# Patient Record
Sex: FEMALE | Race: WHITE | NOT HISPANIC OR LATINO | URBAN - METROPOLITAN AREA
[De-identification: names, ages, dates, MRNs, and addresses within clinical notes are randomized per-mention and may not be internally consistent; named-entity substitution may affect disease eponyms.]

---

## 2024-05-07 ENCOUNTER — TELEPHONE (OUTPATIENT)
Dept: FAMILY MEDICINE | Facility: CLINIC | Age: 69
End: 2024-05-07

## 2024-05-07 NOTE — TELEPHONE ENCOUNTER
----- Message from Ghazal Richards sent at 5/7/2024  3:07 PM CDT -----  Contact: Sara Bradford is calling to get established. Patient has a blocker on spam call and will not get the message. Please try calling [phone]

## 2024-08-16 ENCOUNTER — HOSPITAL ENCOUNTER (OUTPATIENT)
Dept: RADIOLOGY | Facility: HOSPITAL | Age: 69
Discharge: HOME | End: 2024-08-16
Attending: FAMILY MEDICINE
Payer: MEDICARE

## 2024-08-16 ENCOUNTER — OFFICE VISIT (OUTPATIENT)
Dept: FAMILY MEDICINE | Facility: CLINIC | Age: 69
End: 2024-08-16
Payer: MEDICARE

## 2024-08-16 VITALS
OXYGEN SATURATION: 94 % | HEIGHT: 67 IN | DIASTOLIC BLOOD PRESSURE: 78 MMHG | HEART RATE: 44 BPM | WEIGHT: 161.88 LBS | SYSTOLIC BLOOD PRESSURE: 106 MMHG | BODY MASS INDEX: 25.41 KG/M2

## 2024-08-16 DIAGNOSIS — Z12.31 ENCOUNTER FOR SCREENING MAMMOGRAM FOR BREAST CANCER: ICD-10-CM

## 2024-08-16 DIAGNOSIS — Z12.11 ENCOUNTER FOR SCREENING COLONOSCOPY: ICD-10-CM

## 2024-08-16 DIAGNOSIS — Z13.220 LIPID SCREENING: ICD-10-CM

## 2024-08-16 DIAGNOSIS — F31.70 BIPOLAR AFFECTIVE DISORDER IN REMISSION: ICD-10-CM

## 2024-08-16 DIAGNOSIS — Z00.00 ENCOUNTER FOR MEDICAL EXAMINATION TO ESTABLISH CARE: Primary | ICD-10-CM

## 2024-08-16 DIAGNOSIS — Z78.0 MENOPAUSE: ICD-10-CM

## 2024-08-16 DIAGNOSIS — Z79.899 ENCOUNTER FOR LONG-TERM (CURRENT) USE OF OTHER MEDICATIONS: ICD-10-CM

## 2024-08-16 DIAGNOSIS — Z11.59 NEED FOR HEPATITIS C SCREENING TEST: ICD-10-CM

## 2024-08-16 DIAGNOSIS — Z86.73 HISTORY OF STROKE: ICD-10-CM

## 2024-08-16 DIAGNOSIS — M35.00 SJOGREN'S SYNDROME, WITH UNSPECIFIED ORGAN INVOLVEMENT: ICD-10-CM

## 2024-08-16 DIAGNOSIS — K11.20 PAROTITIS: ICD-10-CM

## 2024-08-16 DIAGNOSIS — G62.9 NEUROPATHY: ICD-10-CM

## 2024-08-16 DIAGNOSIS — Z11.4 SCREENING FOR HIV (HUMAN IMMUNODEFICIENCY VIRUS): ICD-10-CM

## 2024-08-16 DIAGNOSIS — J44.89 COPD WITH ASTHMA: ICD-10-CM

## 2024-08-16 PROBLEM — E78.5 HYPERLIPIDEMIA: Status: ACTIVE | Noted: 2021-03-26

## 2024-08-16 PROBLEM — M79.2 NEURALGIA: Status: ACTIVE | Noted: 2024-08-16

## 2024-08-16 PROBLEM — S93.419A SPRAIN OF CALCANEOFIBULAR LIGAMENT OF ANKLE: Status: ACTIVE | Noted: 2024-08-16

## 2024-08-16 PROBLEM — M19.90 ARTHRITIS: Status: ACTIVE | Noted: 2021-03-26

## 2024-08-16 PROBLEM — J40 BRONCHITIS: Status: ACTIVE | Noted: 2021-03-26

## 2024-08-16 PROBLEM — B37.9 CANDIDIASIS: Status: ACTIVE | Noted: 2024-08-16

## 2024-08-16 PROBLEM — J30.2 SEASONAL ALLERGIES: Status: ACTIVE | Noted: 2021-03-26

## 2024-08-16 PROBLEM — M25.519 SHOULDER JOINT PAIN: Status: ACTIVE | Noted: 2024-08-16

## 2024-08-16 PROBLEM — R25.2 SPASM: Status: ACTIVE | Noted: 2024-08-16

## 2024-08-16 PROBLEM — J98.4 RESTRICTIVE LUNG DISEASE: Status: ACTIVE | Noted: 2024-08-16

## 2024-08-16 PROBLEM — M20.10 HALLUX VALGUS WITH BUNIONS: Status: ACTIVE | Noted: 2024-08-16

## 2024-08-16 PROBLEM — J45.909 ASTHMA: Status: ACTIVE | Noted: 2021-03-26

## 2024-08-16 PROBLEM — M79.18 MYALGIA, MULTIPLE SITES: Status: ACTIVE | Noted: 2024-08-16

## 2024-08-16 PROBLEM — J32.9 CHRONIC SINUSITIS: Status: ACTIVE | Noted: 2024-08-16

## 2024-08-16 PROBLEM — J44.9 COPD (CHRONIC OBSTRUCTIVE PULMONARY DISEASE): Status: ACTIVE | Noted: 2018-06-07

## 2024-08-16 PROBLEM — J30.9 ALLERGIC RHINITIS: Status: ACTIVE | Noted: 2024-08-16

## 2024-08-16 PROBLEM — M21.619 HALLUX VALGUS WITH BUNIONS: Status: ACTIVE | Noted: 2024-08-16

## 2024-08-16 PROCEDURE — 77080 DXA BONE DENSITY AXIAL: CPT | Mod: 26,,, | Performed by: RADIOLOGY

## 2024-08-16 PROCEDURE — 99215 OFFICE O/P EST HI 40 MIN: CPT | Mod: PBBFAC,25,PO | Performed by: FAMILY MEDICINE

## 2024-08-16 PROCEDURE — 77080 DXA BONE DENSITY AXIAL: CPT | Mod: TC,PO

## 2024-08-16 PROCEDURE — G2211 COMPLEX E/M VISIT ADD ON: HCPCS | Mod: S$PBB,,, | Performed by: FAMILY MEDICINE

## 2024-08-16 PROCEDURE — 99999 PR PBB SHADOW E&M-EST. PATIENT-LVL V: CPT | Mod: PBBFAC,,, | Performed by: FAMILY MEDICINE

## 2024-08-16 PROCEDURE — 99204 OFFICE O/P NEW MOD 45 MIN: CPT | Mod: S$PBB,,, | Performed by: FAMILY MEDICINE

## 2024-08-16 RX ORDER — CLONAZEPAM 0.5 MG/1
0.5 TABLET ORAL 2 TIMES DAILY PRN
COMMUNITY

## 2024-08-16 RX ORDER — DEUTETRABENAZINE 12 MG/1
TABLET, COATED ORAL
COMMUNITY
Start: 2023-11-08

## 2024-08-16 RX ORDER — PILOCARPINE HYDROCHLORIDE 5 MG/1
5 TABLET, FILM COATED ORAL 2 TIMES DAILY
COMMUNITY
Start: 2024-06-07

## 2024-08-16 RX ORDER — OMEPRAZOLE 20 MG/1
20 CAPSULE, DELAYED RELEASE ORAL
COMMUNITY
Start: 2024-05-04

## 2024-08-16 RX ORDER — CLOPIDOGREL BISULFATE 75 MG/1
TABLET, FILM COATED ORAL
COMMUNITY
End: 2024-08-16 | Stop reason: SDUPTHER

## 2024-08-16 RX ORDER — FOLIC ACID 1 MG/1
TABLET ORAL
COMMUNITY
Start: 2024-05-30

## 2024-08-16 RX ORDER — SCOLOPAMINE TRANSDERMAL SYSTEM 1 MG/1
PATCH, EXTENDED RELEASE TRANSDERMAL
Status: CANCELLED | OUTPATIENT
Start: 2024-08-16

## 2024-08-16 RX ORDER — KRILL/OM-3/DHA/EPA/PHOSPHO/AST 500-150-45
CAPSULE ORAL
COMMUNITY

## 2024-08-16 RX ORDER — EZETIMIBE 10 MG/1
TABLET ORAL
COMMUNITY
Start: 2024-06-07 | End: 2024-08-23 | Stop reason: SDUPTHER

## 2024-08-16 RX ORDER — ZOLPIDEM TARTRATE 10 MG/1
TABLET ORAL
COMMUNITY
Start: 2024-03-19

## 2024-08-16 RX ORDER — ALBUTEROL SULFATE 0.83 MG/ML
2.5 SOLUTION RESPIRATORY (INHALATION) 4 TIMES DAILY
COMMUNITY
Start: 2024-06-07

## 2024-08-16 RX ORDER — PROPRANOLOL HYDROCHLORIDE 10 MG/1
TABLET ORAL
COMMUNITY
Start: 2024-03-19

## 2024-08-16 RX ORDER — MONTELUKAST SODIUM 10 MG/1
TABLET ORAL
COMMUNITY
Start: 2023-12-26 | End: 2024-08-16 | Stop reason: SDUPTHER

## 2024-08-16 RX ORDER — LAMOTRIGINE 200 MG/1
TABLET ORAL
COMMUNITY

## 2024-08-16 RX ORDER — LORATADINE 10 MG/1
TABLET ORAL
COMMUNITY

## 2024-08-16 RX ORDER — AMOXICILLIN AND CLAVULANATE POTASSIUM 875; 125 MG/1; MG/1
1 TABLET, FILM COATED ORAL 2 TIMES DAILY
Qty: 20 TABLET | Refills: 0 | Status: SHIPPED | OUTPATIENT
Start: 2024-08-16 | End: 2024-08-26

## 2024-08-16 RX ORDER — ESTRADIOL 1 MG/1
TABLET ORAL
Status: CANCELLED | OUTPATIENT
Start: 2024-08-16

## 2024-08-16 RX ORDER — DULOXETIN HYDROCHLORIDE 30 MG/1
30 CAPSULE, DELAYED RELEASE ORAL
COMMUNITY
Start: 2024-04-24

## 2024-08-16 RX ORDER — GABAPENTIN 100 MG/1
100 CAPSULE ORAL 2 TIMES DAILY
COMMUNITY
End: 2024-08-16 | Stop reason: SDUPTHER

## 2024-08-16 RX ORDER — LANOLIN ALCOHOL/MO/W.PET/CERES
CREAM (GRAM) TOPICAL
COMMUNITY

## 2024-08-16 RX ORDER — DULOXETIN HYDROCHLORIDE 20 MG/1
20 CAPSULE, DELAYED RELEASE ORAL
COMMUNITY
Start: 2024-04-28

## 2024-08-16 RX ORDER — CLOPIDOGREL BISULFATE 75 MG/1
75 TABLET, FILM COATED ORAL DAILY
Qty: 90 TABLET | Refills: 4 | Status: SHIPPED | OUTPATIENT
Start: 2024-08-16

## 2024-08-16 RX ORDER — ESTRADIOL 1 MG/1
TABLET ORAL
COMMUNITY
Start: 2024-06-07

## 2024-08-16 RX ORDER — CLONAZEPAM 0.25 MG/1
0.25 TABLET, ORALLY DISINTEGRATING ORAL DAILY
COMMUNITY
Start: 2024-01-22

## 2024-08-16 RX ORDER — FLUTICASONE FUROATE, UMECLIDINIUM BROMIDE AND VILANTEROL TRIFENATATE 100; 62.5; 25 UG/1; UG/1; UG/1
POWDER RESPIRATORY (INHALATION)
COMMUNITY
Start: 2024-06-07 | End: 2024-08-16 | Stop reason: SDUPTHER

## 2024-08-16 RX ORDER — QUETIAPINE FUMARATE 300 MG/1
TABLET, FILM COATED ORAL
COMMUNITY
Start: 2024-03-19

## 2024-08-16 RX ORDER — GABAPENTIN 100 MG/1
200 CAPSULE ORAL NIGHTLY
Qty: 180 CAPSULE | Refills: 4 | Status: SHIPPED | OUTPATIENT
Start: 2024-08-16

## 2024-08-16 RX ORDER — LEVOCARNITINE TARTRATE 500 MG
CAPSULE ORAL
COMMUNITY

## 2024-08-16 RX ORDER — FLUTICASONE FUROATE, UMECLIDINIUM BROMIDE AND VILANTEROL TRIFENATATE 100; 62.5; 25 UG/1; UG/1; UG/1
1 POWDER RESPIRATORY (INHALATION) DAILY
Qty: 60 EACH | Refills: 12 | Status: SHIPPED | OUTPATIENT
Start: 2024-08-16

## 2024-08-16 RX ORDER — MONTELUKAST SODIUM 10 MG/1
10 TABLET ORAL NIGHTLY
Qty: 90 TABLET | Refills: 4 | Status: SHIPPED | OUTPATIENT
Start: 2024-08-16

## 2024-08-16 RX ORDER — ZIPRASIDONE HYDROCHLORIDE 20 MG/1
CAPSULE ORAL
COMMUNITY

## 2024-08-16 RX ORDER — ALBUTEROL SULFATE 90 UG/1
2 INHALANT RESPIRATORY (INHALATION) 4 TIMES DAILY
Qty: 1 EACH | Refills: 12 | Status: SHIPPED | OUTPATIENT
Start: 2024-08-16

## 2024-08-16 NOTE — PROGRESS NOTES
PLAN:    Assessment & Plan  1. Establishment of care.  She is a new patient establishing care after moving from [place]. She has a history of Sjogren's syndrome, bipolar disorder, COPD, and asthma. She is currently managed by a psychiatrist for her bipolar disorder, which has been stable for 4 years. She will continue seeing her psychiatrist virtually and in-person every 6 months. Blood work will be updated today as she is fasting.    2. Parotid gland swelling.  She reports swelling in her parotid gland that started 2 days ago. She has a history of Sjogren's syndrome and typically takes antibiotics for this condition. She is allergic to Cephalexin and Levaquin but can take Augmentin, which has been effective in the past. A prescription for Augmentin will be sent to [facility] on [facility] in [place]. If the swelling does not improve with antibiotics, she should see an ENT specialist sooner.    3. Bipolar disorder.  Her bipolar disorder is stable with current psychiatric medications managed by her psychiatrist. She will continue her current regimen and follow up with her psychiatrist virtually every 3 months and in-person every 6 months.    4. COPD and asthma.  She uses ProAir (2 puffs, four times a day) and Trelegy (1 puff daily) to manage her COPD and asthma. She also takes Singulair for these conditions. She will continue her current medication regimen and sees a pulmonologist for check-ups and prescriptions as needed.    5. Neuropathy.  She takes Gabapentin 200 mg at night for neuropathy in her legs, feet, and hands. She will continue this medication.    6. Panic attacks.  She takes Propranolol 10 mg at night to manage severe panic attacks, which keeps her heart rate in the 40s. She will continue this medication.    7. Health Maintenance.  She is due for a mammogram, bone density scan, and colonoscopy. The mammogram and bone density scan will be scheduled in this building. The colonoscopy will be scheduled at  [facility] in [place].    Follow-up  The patient will follow up in 6 months.    Problem List Items Addressed This Visit       Bipolar affective disorder in remission (Chronic)     Continue current medications.  Follow-up with Psychiatry.  ER precautions for any severe symptoms.         COPD with asthma (Chronic)     Continue inhalers.  COPD with asthma precautions.  Follow-up with Pulmonary if no improvement.  ER precautions for severe symptoms.         Relevant Medications    albuterol sulfate (PROAIR DIGIHALER) 90 mcg/actuation aebs    montelukast (SINGULAIR) 10 mg tablet    fluticasone-umeclidin-vilanter (TRELEGY ELLIPTA) 100-62.5-25 mcg DsDv    History of stroke (Chronic)     Continue Plavix.  Follow-up with Neurology stroke precautions.  ER precautions.         Relevant Medications    PLAVIX 75 mg tablet    Neuropathy (Chronic)     Continue gabapentin.  Discussed risk and benefits of medication use.         Relevant Medications    gabapentin (NEURONTIN) 100 MG capsule    Menopause (Chronic)     Update bone density scan.  Follow-up after for results.         Relevant Orders    DXA Bone Density Axial Skeleton 1 or more sites (Completed)    Encounter for long-term (current) use of other medications (Chronic)     Complete history and physical was completed today.  Complete and thorough medication reconciliation was performed.  Discussed risks and benefits of medications.  Advised patient on orders and health maintenance.  We discussed old records and old labs if available.  Will request any records not available through epic.  Continue current medications listed on your summary sheet.           Relevant Orders    Lipid Panel (Completed)    Hemoglobin A1C (Completed)    CBC Without Differential (Completed)    Comprehensive Metabolic Panel (Completed)    TSH (Completed)    Sjogren's syndrome    Parotitis     Start Augmentin.Discussed condition course and signs and symptoms to expect.  Patient advised take  anti-inflammatories and or Tylenol for pain or fever.  ER precautions.  Call MD or follow-up to clinic if not improving or worsening symptoms.           Relevant Medications    amoxicillin-clavulanate 875-125mg (AUGMENTIN) 875-125 mg per tablet    Other Relevant Orders    Ambulatory referral/consult to ENT    Encounter for medical examination to establish care - Primary     Complete history and physical was completed today.  Complete and thorough medication reconciliation was performed.  Discussed risks and benefits of medications.  Advised patient on orders and health maintenance.  We discussed old records and old labs if available.  Will request any records not available through epic.  Continue current medications listed on your summary sheet.           Relevant Orders    Lipid Panel (Completed)    Hepatitis C Antibody (Completed)    HIV 1/2 Ag/Ab (4th Gen) (Completed)    Hemoglobin A1C (Completed)    CBC Without Differential (Completed)    Comprehensive Metabolic Panel (Completed)    TSH (Completed)     Other Visit Diagnoses       Need for hepatitis C screening test        Relevant Orders    Hepatitis C Antibody (Completed)    Screening for HIV (human immunodeficiency virus)        Relevant Orders    HIV 1/2 Ag/Ab (4th Gen) (Completed)    Lipid screening        Relevant Orders    Lipid Panel (Completed)    Encounter for screening colonoscopy        Relevant Orders    Ambulatory referral/consult to Endo Procedure     Encounter for screening mammogram for breast cancer        Relevant Orders    Mammo Digital Screening Bilat w/ Armen          Future Appointments       Date Provider Specialty Appt Notes    8/22/2024  Pre-Admission Testing colonoscopy    8/23/2024  Radiology     10/9/2024 Jean Jose MD Otolaryngology parotitis           Medication Management for assessment above:   Medication List with Changes/Refills   New Medications    AMOXICILLIN-CLAVULANATE 875-125MG (AUGMENTIN) 875-125 MG PER  TABLET    Take 1 tablet by mouth 2 (two) times daily. Take with food. for 10 days   Current Medications    ALBUTEROL (PROVENTIL) 2.5 MG /3 ML (0.083 %) NEBULIZER SOLUTION    Take 2.5 mg by nebulization 4 (four) times daily.    AUSTEDO 12 MG TAB        CLONAZEPAM (KLONOPIN) 0.25 MG TBDL    0.25 mg once daily.    CLONAZEPAM (KLONOPIN) 0.5 MG TABLET    Take 0.5 mg by mouth 2 (two) times daily as needed for Anxiety.    CYANOCOBALAMIN (VITAMIN B-12) 1000 MCG TABLET    Take 1 tablet every day by oral route as directed.    DULOXETINE (CYMBALTA) 20 MG CAPSULE    Take 20 mg by mouth.    DULOXETINE (CYMBALTA) 30 MG CAPSULE    Take 30 mg by mouth.    ESTRADIOL (ESTRACE) 1 MG TABLET        EZETIMIBE (ZETIA) 10 MG TABLET        FOLIC ACID (FOLVITE) 1 MG TABLET        GEODON 20 MG CAP        KRILL-OM-3-DHA-EPA-PHOSPHO-AST (KRILL OIL) 933-726-51-75 MG CAP        LAMOTRIGINE (LAMICTAL) 200 MG TABLET    Take 1 tablet every day by oral route as directed.    LEVOCARNITINE (L-CARNITINE) 500 MG CAP        LORATADINE (CLARITIN) 10 MG TABLET    Take 1 tablet every day by oral route in the evening for 30 days.    OMEPRAZOLE (PRILOSEC) 20 MG CAPSULE    Take 20 mg by mouth.    PILOCARPINE (SALAGEN) 5 MG TAB    Take 5 mg by mouth 2 (two) times daily.    PROPRANOLOL (INDERAL) 10 MG TABLET        QUETIAPINE (SEROQUEL) 300 MG TAB        SCOPOLAMINE BASE TD        VITAMIN B COMPLEX (SUPER B COMPLEX-B-12 ORAL)        ZOLPIDEM (AMBIEN) 10 MG TAB       Changed and/or Refilled Medications    Modified Medication Previous Medication    ALBUTEROL SULFATE (PROAIR DIGIHALER) 90 MCG/ACTUATION AEBS albuterol sulfate (PROAIR DIGIHALER INHL)       Inhale 2 puffs into the lungs 4 (four) times daily.        FLUTICASONE-UMECLIDIN-VILANTER (TRELEGY ELLIPTA) 100-62.5-25 MCG DSDV fluticasone-umeclidin-vilanter (TRELEGY ELLIPTA) 100-62.5-25 mcg DsDv       Inhale 1 puff into the lungs once daily.        GABAPENTIN (NEURONTIN) 100 MG CAPSULE gabapentin (NEURONTIN) 100  MG capsule       Take 2 capsules (200 mg total) by mouth every evening.    Take 100 mg by mouth 2 (two) times daily.    MONTELUKAST (SINGULAIR) 10 MG TABLET montelukast (SINGULAIR) 10 mg tablet       Take 1 tablet (10 mg total) by mouth every evening.        PLAVIX 75 MG TABLET PLAVIX 75 mg tablet       Take 1 tablet (75 mg total) by mouth once daily.           Oliver Connor M.D.  ==========================================================================  Subjective:   Patient ID: Sara Stark is a 69 y.o. female.  has no past medical history on file.   Chief Complaint: Establish Care and Annual Exam      History of Present Illness  The patient is a pleasant 69-year-old female here to establish care. She is here today to get some blood work and discuss her chronic medical conditions. She is also due for some health maintenance items.    She has a history of Sjogren's syndrome and was previously under the care of an ENT specialist, Dr. Cabrera, in [place]. She is considering establishing care with a local rheumatologist. She typically receives her medication from her primary care provider and sees him every six months. She takes pilocarpine daily for her Sjogren's syndrome. She reports swelling in her neck, which alternates between her right and left parotid glands but never occurs simultaneously. She is unsure of the specific antibiotic she takes for this swelling but knows she is allergic to CEPHALEXIN and LEVAQUIN. She can tolerate AUGMENTIN, which has been effective for her in the past. She believes the AUGMENTIN is helping.    She reports feeling unwell, with symptoms starting two days ago.    She has a history of COPD and asthma and uses ProAir, taking 2 puffs four times a day. She also takes Singulair and Trelegy, 1 puff daily, for her COPD and asthma. She sees a pulmonologist for checkups and if she develops bronchitis, which occurs once or twice a year, they prescribe medication over the phone. She  has environmental allergies to grass and cats and has received allergy shots for grass and weeds. She was not treated for her cat allergy as she plans to avoid them.    She takes gabapentin 200 mg at night for neuropathy in her legs, feet, and hands. She is considering discontinuing estradiol.    She had a left-sided stroke in 2020 with no residual effects. At that time, her neurologist informed her of an 80 percent blockage on the left side of her carotid. She has a neurologist and all her specialists are in [place]. She has moved here from [place].    She takes propranolol 10 mg at night to manage severe panic attacks. If she misses a dose, she experiences a panic attack. She has a history of smoking a pack per day for several years but has since quit.    She is due for a colonoscopy and is fasting today. She is also due for a mammogram and a bone density scan.    She sees her psychiatrist every 3 months virtually and then every 6 months in person. She takes all her psychiatric medications for bipolar disorder, which has been stable for 4 years.    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    FAMILY HISTORY  Her mother had COPD.    ALLERGIES  She is allergic to CEPHALEXIN and LEVAQUIN.    Problem List Items Addressed This Visit       Bipolar affective disorder in remission (Chronic)    Overview     Chronic.  Stable complex regimen.  Follows closely with Psychiatry.         Current Assessment & Plan     Continue current medications.  Follow-up with Psychiatry.  ER precautions for any severe symptoms.         COPD with asthma (Chronic)    Overview     Chronic.  Stable.  Patient on albuterol and Trelegy.  Not currently following with Pulmonary.  History of smoking.         Current Assessment & Plan     Continue inhalers.  COPD with asthma precautions.  Follow-up with Pulmonary if no improvement.  ER precautions for severe symptoms.          History of stroke (Chronic)    Overview     Patient reports history of stroke with no residual deficits.  See HPI.  She is on Plavix.  Reports compliance no side effects reported.         Current Assessment & Plan     Continue Plavix.  Follow-up with Neurology stroke precautions.  ER precautions.         Neuropathy (Chronic)    Overview     Chronic.  Etiology unknown.  Patient on gabapentin.  She uses this to sleep.         Current Assessment & Plan     Continue gabapentin.  Discussed risk and benefits of medication use.         Menopause (Chronic)    Overview     Chronic.  Stable.  Patient is due for bone density scanning for osteoporosis.         Current Assessment & Plan     Update bone density scan.  Follow-up after for results.         Encounter for long-term (current) use of other medications (Chronic)    Overview     CHRONIC. Stable. Compliant with medications for managed conditions. See medication list. No SE reported.   Routine lab analysis is being monitored. Refills were addressed.  Lab Results   Component Value Date    WBC 8.26 08/16/2024    HGB 12.3 08/16/2024    HCT 41.8 08/16/2024    MCV 92 08/16/2024     08/16/2024         Chemistry        Component Value Date/Time     08/16/2024 1456    K 4.5 08/16/2024 1456     08/16/2024 1456    CO2 25 08/16/2024 1456    BUN 6 (L) 08/16/2024 1456    CREATININE 0.8 08/16/2024 1456    GLU 98 08/16/2024 1456        Component Value Date/Time    CALCIUM 9.2 08/16/2024 1456    ALKPHOS 73 08/16/2024 1456    AST 15 08/16/2024 1456    ALT 9 (L) 08/16/2024 1456    BILITOT 0.5 08/16/2024 1456          Lab Results   Component Value Date    TSH 0.473 08/16/2024              Current Assessment & Plan     Complete history and physical was completed today.  Complete and thorough medication reconciliation was performed.  Discussed risks and benefits of medications.  Advised patient on orders and health maintenance.  We discussed old records and old labs if  available.  Will request any records not available through epic.  Continue current medications listed on your summary sheet.           Sjogren's syndrome    Parotitis    Overview     Recurrent problem.  Patient has history of Sjogren's and occasionally gets inflamed/infected parotid glands normally resolves with antibiotics.  Currently with pain swelling in the left jaw.         Current Assessment & Plan     Start Augmentin.Discussed condition course and signs and symptoms to expect.  Patient advised take anti-inflammatories and or Tylenol for pain or fever.  ER precautions.  Call MD or follow-up to clinic if not improving or worsening symptoms.           Encounter for medical examination to establish care - Primary    Overview     New patient.  [facility])  ENT- Dr. Canales  Pulm- Dr. Rosa  Neurology- Dr. Figueroa (stroke 2020)  Ortho- Dr. Shell  Psych-Dr. Colon(psych meds)          Current Assessment & Plan     Complete history and physical was completed today.  Complete and thorough medication reconciliation was performed.  Discussed risks and benefits of medications.  Advised patient on orders and health maintenance.  We discussed old records and old labs if available.  Will request any records not available through epic.  Continue current medications listed on your summary sheet.            Other Visit Diagnoses       Need for hepatitis C screening test        Screening for HIV (human immunodeficiency virus)        Lipid screening        Encounter for screening colonoscopy        Encounter for screening mammogram for breast cancer                 Review of patient's allergies indicates:   Allergen Reactions    Levofloxacin in d5w Blisters and Swelling    Cephalexin Hives, Itching, Nausea And Vomiting and Rash     Current Outpatient Medications   Medication Instructions    albuterol (PROVENTIL) 2.5 mg, Nebulization, 4 times daily    albuterol sulfate  (PROAIR DIGIHALER) 90 mcg/actuation aebs 2 puffs, Inhalation, 4 times daily    amoxicillin-clavulanate 875-125mg (AUGMENTIN) 875-125 mg per tablet 1 tablet, Oral, 2 times daily, Take with food.    AUSTEDO 12 mg Tab     clonazePAM (KLONOPIN) 0.5 mg, Oral, 2 times daily PRN    clonazePAM (KLONOPIN) 0.25 mg, Daily    cyanocobalamin (VITAMIN B-12) 1000 MCG tablet Take 1 tablet every day by oral route as directed.    DULoxetine (CYMBALTA) 20 mg, Oral    DULoxetine (CYMBALTA) 30 mg, Oral    estradioL (ESTRACE) 1 MG tablet     ezetimibe (ZETIA) 10 mg tablet     fluticasone-umeclidin-vilanter (TRELEGY ELLIPTA) 100-62.5-25 mcg DsDv 1 puff, Inhalation, Daily    folic acid (FOLVITE) 1 MG tablet     gabapentin (NEURONTIN) 200 mg, Oral, Nightly    GEODON 20 mg Cap     krill-om-3-dha-epa-phospho-ast (KRILL OIL) 759-548-24-75 mg Cap     lamoTRIgine (LAMICTAL) 200 MG tablet Take 1 tablet every day by oral route as directed.    levOCARNitine (L-CARNITINE) 500 mg Cap     loratadine (CLARITIN) 10 mg tablet Take 1 tablet every day by oral route in the evening for 30 days.    montelukast (SINGULAIR) 10 mg, Oral, Nightly    omeprazole (PRILOSEC) 20 mg, Oral    pilocarpine (SALAGEN) 5 mg, Oral, 2 times daily    PLAVIX 75 mg, Oral, Daily    propranoloL (INDERAL) 10 MG tablet     QUEtiapine (SEROQUEL) 300 MG Tab     SCOPOLAMINE BASE TD     vitamin B complex (SUPER B COMPLEX-B-12 ORAL)     zolpidem (AMBIEN) 10 mg Tab       I have reviewed the PMH, social history, FamilyHx, surgical history, allergies and medications documented / confirmed by the patient at the time of this visit.  Review of Systems   Constitutional:  Positive for fatigue. Negative for chills, fever and unexpected weight change.   HENT:  Positive for ear pain. Negative for sore throat.    Eyes:  Negative for redness and visual disturbance.   Respiratory:  Negative for cough and shortness of breath.    Cardiovascular:  Negative for chest pain and palpitations.  "  Gastrointestinal:  Negative for nausea and vomiting.   Genitourinary:  Negative for difficulty urinating and hematuria.   Musculoskeletal:  Positive for arthralgias. Negative for myalgias.   Skin:  Negative for rash and wound.   Neurological:  Negative for weakness and headaches.   Psychiatric/Behavioral:  Negative for sleep disturbance. The patient is not nervous/anxious.      Objective:   /78   Pulse (!) 44   Ht 5' 7" (1.702 m)   Wt 73.4 kg (161 lb 14.4 oz)   SpO2 (!) 94%   BMI 25.36 kg/m²   Physical Exam  Vitals and nursing note reviewed.   Constitutional:       General: She is not in acute distress.     Appearance: She is well-developed. She is not ill-appearing, toxic-appearing or diaphoretic.   HENT:      Head: Normocephalic and atraumatic.      Jaw: Tenderness present.      Salivary Glands: Left salivary gland is diffusely enlarged and tender.        Right Ear: Hearing, tympanic membrane, ear canal and external ear normal. There is no impacted cerumen.      Left Ear: Hearing, tympanic membrane, ear canal and external ear normal. There is no impacted cerumen.      Nose: Congestion present. No rhinorrhea.      Mouth/Throat:      Pharynx: No posterior oropharyngeal erythema.   Eyes:      General: Lids are normal.      Extraocular Movements: Extraocular movements intact.      Conjunctiva/sclera: Conjunctivae normal.      Pupils: Pupils are equal, round, and reactive to light.   Cardiovascular:      Rate and Rhythm: Bradycardia present.      Pulses: Normal pulses.   Pulmonary:      Effort: Pulmonary effort is normal. No respiratory distress.      Breath sounds: Normal breath sounds.   Abdominal:      General: Bowel sounds are normal.      Palpations: Abdomen is soft.   Musculoskeletal:         General: Tenderness present. Normal range of motion.      Cervical back: Normal range of motion and neck supple.   Skin:     General: Skin is warm and dry.      Capillary Refill: Capillary refill takes less than " 2 seconds.      Coloration: Skin is not pale.   Neurological:      General: No focal deficit present.      Mental Status: She is alert and oriented to person, place, and time. She is not disoriented.      Cranial Nerves: No cranial nerve deficit.      Motor: No weakness.      Gait: Gait normal.   Psychiatric:         Attention and Perception: She is attentive.         Mood and Affect: Mood normal. Mood is not anxious or depressed.         Speech: Speech is not rapid and pressured or slurred.         Behavior: Behavior normal. Behavior is not agitated, aggressive or hyperactive. Behavior is cooperative.         Thought Content: Thought content normal. Thought content is not paranoid or delusional. Thought content does not include homicidal or suicidal ideation. Thought content does not include homicidal or suicidal plan.         Cognition and Memory: Memory is not impaired.         Judgment: Judgment normal.       Physical Exam  Vital Signs  Heart rate is 44.    Results      Assessment:     1. Encounter for medical examination to establish care    2. Encounter for long-term (current) use of other medications    3. Need for hepatitis C screening test    4. Screening for HIV (human immunodeficiency virus)    5. Lipid screening    6. Encounter for screening colonoscopy    7. Encounter for screening mammogram for breast cancer    8. Bipolar affective disorder in remission    9. Parotitis    10. COPD with asthma    11. Menopause    12. Neuropathy    13. History of stroke    14. Sjogren's syndrome, with unspecified organ involvement      MDM:   New patient  Total time: 45 minutes.  This includes total time spent on the encounter, which includes face to face time and non-face to face time preparing to see the patient (eg, review of previous medical records, tests), Obtaining and/or reviewing separately obtained history, documenting clinical information in the electronic or other health record, independently interpreting  results (not separately reported)/communicating results to the patient/family/caregiver, and/or care coordination (not separately reported).    I have Reviewed and summarized old records.  I have performed thorough medication reconciliation today and discussed risk and benefits of medications.  I have reviewed labs and discussed with patient.  All questions were answered.  I am requesting old records and will review them once they are available.Prev PCP- Dr. Weir ([facility])  ENT- Dr. Canales  Pulm- Dr. Rosa  Neurology- Dr. Figueroa (stroke 2020)  Ortho- Dr. Shell  Psych-Dr. Colon(psych meds)   Visit today included increased complexity associated with the care of the episodic problem see above assessment addressed and managing the longitudinal care of the patient due to the serious and/or complex managed problem(s) see above.  I have signed for the following orders AND/OR meds.  Orders Placed This Encounter   Procedures    Mammo Digital Screening Bilat w/ Armen     Standing Status:   Future     Standing Expiration Date:   2/16/2026     Order Specific Question:   May the Radiologist modify the order per protocol to meet the clinical needs of the patient?     Answer:   Yes    DXA Bone Density Axial Skeleton 1 or more sites     Standing Status:   Future     Number of Occurrences:   1     Standing Expiration Date:   8/16/2027     Order Specific Question:   May the Radiologist modify the order per protocol to meet the clinical needs of the patient?     Answer:   Yes     Order Specific Question:   Release to patient     Answer:   Immediate    Lipid Panel     Standing Status:   Future     Number of Occurrences:   1     Standing Expiration Date:   10/15/2025    Hepatitis C Antibody     Standing Status:   Future     Number of Occurrences:   1     Standing Expiration Date:   10/15/2025    HIV 1/2 Ag/Ab (4th Gen)     Standing Status:   Future     Number of Occurrences:   1     Standing Expiration Date:   10/15/2025     Hemoglobin A1C     Standing Status:   Future     Number of Occurrences:   1     Standing Expiration Date:   10/15/2025    CBC Without Differential     Standing Status:   Future     Number of Occurrences:   1     Standing Expiration Date:   10/15/2025    Comprehensive Metabolic Panel     Standing Status:   Future     Number of Occurrences:   1     Standing Expiration Date:   10/15/2025    TSH     Standing Status:   Future     Number of Occurrences:   1     Standing Expiration Date:   10/15/2025    Ambulatory referral/consult to Endo Procedure      Standing Status:   Future     Standing Expiration Date:   2/28/2025     Referral Priority:   Routine     Referral Type:   Consultation     Number of Visits Requested:   1    Ambulatory referral/consult to ENT     Standing Status:   Future     Standing Expiration Date:   9/16/2025     Referral Priority:   Routine     Referral Type:   Consultation     Referral Reason:   Specialty Services Required     Requested Specialty:   Otolaryngology     Number of Visits Requested:   1     Medications Ordered This Encounter   Medications    albuterol sulfate (PROAIR DIGIHALER) 90 mcg/actuation aebs     Sig: Inhale 2 puffs into the lungs 4 (four) times daily.     Dispense:  1 each     Refill:  12    amoxicillin-clavulanate 875-125mg (AUGMENTIN) 875-125 mg per tablet     Sig: Take 1 tablet by mouth 2 (two) times daily. Take with food. for 10 days     Dispense:  20 tablet     Refill:  0    fluticasone-umeclidin-vilanter (TRELEGY ELLIPTA) 100-62.5-25 mcg DsDv     Sig: Inhale 1 puff into the lungs once daily.     Dispense:  60 each     Refill:  12    gabapentin (NEURONTIN) 100 MG capsule     Sig: Take 2 capsules (200 mg total) by mouth every evening.     Dispense:  180 capsule     Refill:  4    montelukast (SINGULAIR) 10 mg tablet     Sig: Take 1 tablet (10 mg total) by mouth every evening.     Dispense:  90 tablet     Refill:  4    PLAVIX 75 mg tablet     Sig: Take 1 tablet (75  mg total) by mouth once daily.     Dispense:  90 tablet     Refill:  4        Follow up in about 6 months (around 2/16/2025), or if symptoms worsen or fail to improve, for Med refills, LAB RESULTS.  Future Appointments       Date Provider Specialty Appt Notes    8/22/2024  Pre-Admission Testing colonoscopy    8/23/2024  Radiology     10/9/2024 Jean Jose MD Otolaryngology parotitis          If no improvement in symptoms or symptoms worsen, advised to call/follow-up at clinic or go to ER. Patient voiced understanding and all questions/concerns were addressed.   DISCLAIMER: This note was compiled by using a speech recognition dictation system and therefore please be aware that typographical / speech recognition errors can and do occur.  Please contact me if you see any errors specifically.  Consent was obtained for SIMONE recording system prior to the visit.    Oliver Connor M.D.       Office: [phone] [address] [place] [address]  FAX: [phone]

## 2024-08-16 NOTE — PATIENT INSTRUCTIONS
Follow up in about 1 year (around 8/16/2025), or if symptoms worsen or fail to improve.     Dear patient,   As a result of recent federal legislation (The Federal Cures Act), you may receive lab or pathology results from your visit in your [facility] account before your physician is able to contact you. Your physician or their representative will relay the results to you with their recommendations at their soonest availability.     If no improvement in symptoms or symptoms worsen, please be advised to call MD, follow-up at clinic and/or go to ER if becomes severe.    Oliver Connor M.D.        We Offer TELEHEALTH & Same Day Appointments!   Book your Telehealth appointment with me through my nurse or   Clinic appointments on Logly!    [address] [place] [address]    Office: [phone]   FAX: [phone]    Check out my Facebook Page and Follow Me at: [email or web address]    Check out my website at [email or web address] by clicking on: [email or web address]    To Schedule appointments online, go to [email or web address] Onsite: [email or web address]

## 2024-08-17 PROBLEM — K11.20 PAROTITIS: Chronic | Status: ACTIVE | Noted: 2024-08-16

## 2024-08-17 PROBLEM — Z86.73 HISTORY OF STROKE: Chronic | Status: ACTIVE | Noted: 2018-04-24

## 2024-08-17 PROBLEM — F31.70 BIPOLAR AFFECTIVE DISORDER IN REMISSION: Chronic | Status: ACTIVE | Noted: 2020-01-29

## 2024-08-17 PROBLEM — Z00.00 ENCOUNTER FOR MEDICAL EXAMINATION TO ESTABLISH CARE: Status: ACTIVE | Noted: 2024-08-17

## 2024-08-17 PROBLEM — G62.9 NEUROPATHY: Chronic | Status: ACTIVE | Noted: 2024-08-16

## 2024-08-17 PROBLEM — Z79.899 ENCOUNTER FOR LONG-TERM (CURRENT) USE OF OTHER MEDICATIONS: Chronic | Status: ACTIVE | Noted: 2024-08-16

## 2024-08-17 PROBLEM — Z78.0 MENOPAUSE: Chronic | Status: ACTIVE | Noted: 2024-08-16

## 2024-08-17 PROBLEM — J44.89 COPD WITH ASTHMA: Chronic | Status: ACTIVE | Noted: 2018-06-07

## 2024-08-17 NOTE — ASSESSMENT & PLAN NOTE
Continue current medications.  Follow-up with Psychiatry.  ER precautions for any severe symptoms.

## 2024-08-17 NOTE — ASSESSMENT & PLAN NOTE
Start Augmentin.Discussed condition course and signs and symptoms to expect.  Patient advised take anti-inflammatories and or Tylenol for pain or fever.  ER precautions.  Call MD or follow-up to clinic if not improving or worsening symptoms.

## 2024-08-17 NOTE — PROGRESS NOTES
Please call the patient with results. [phone]   .The bone density test (DEXA scan) shows osteopenia, known as weakening of the bones. You need to be on Calcium and Vitamin D supplementation and also start using weight bearing exercises to help reduce the risk of a fracture.  Also, please start an over-the-counter vitamin-D/calcium supplementation.  Ask the patient to recheck the DEXA scan in 2 years.

## 2024-08-17 NOTE — ASSESSMENT & PLAN NOTE
Continue inhalers.  COPD with asthma precautions.  Follow-up with Pulmonary if no improvement.  ER precautions for severe symptoms.

## 2024-08-20 ENCOUNTER — TELEPHONE (OUTPATIENT)
Dept: FAMILY MEDICINE | Facility: CLINIC | Age: 69
End: 2024-08-20
Payer: MEDICARE

## 2024-08-20 NOTE — TELEPHONE ENCOUNTER
----- Message from Carlos Brown sent at 8/20/2024  3:44 PM CDT -----  Contact: self [phone]    Who left a message for the patient: nurse    Does patient know what this is regarding:  bone density scan results    Would you like a call back, or a response through your [facility] portal?:   call back    Comments:

## 2024-08-22 ENCOUNTER — TELEPHONE (OUTPATIENT)
Dept: PREADMISSION TESTING | Facility: HOSPITAL | Age: 69
End: 2024-08-22
Payer: MEDICARE

## 2024-08-22 ENCOUNTER — HOSPITAL ENCOUNTER (OUTPATIENT)
Dept: PREADMISSION TESTING | Facility: HOSPITAL | Age: 69
Discharge: HOME | End: 2024-08-22
Attending: FAMILY MEDICINE
Payer: MEDICARE

## 2024-08-22 DIAGNOSIS — Z12.11 ENCOUNTER FOR SCREENING COLONOSCOPY: Primary | ICD-10-CM

## 2024-08-22 RX ORDER — POLYETHYLENE GLYCOL 3350, SODIUM SULFATE, POTASSIUM CHLORIDE, MAGNESIUM SULFATE, AND SODIUM CHLORIDE FOR ORAL SOLUTION 178.7-7.3G
2 KIT ORAL SEE ADMIN INSTRUCTIONS
Qty: 1 EACH | Refills: 0 | Status: SHIPPED | OUTPATIENT
Start: 2024-08-22

## 2024-08-22 NOTE — TELEPHONE ENCOUNTER
----- Message from Oliver Connor MD sent at 8/22/2024  3:32 PM CDT -----  Regarding: RE: Okay to hold Plavix for procedure?  Yes  ----- Message -----  From: Alda Russo RN  Sent: 8/22/2024   2:17 PM CDT  To: Oliver Connor MD  Subject: Okay to hold Plavix for procedure?               Good Afternoon.   Is it okay for this patient to hold her Plavix for 5 days   before Colonoscopy procedure on 10/01/2024?  Please advise.   Thanks.   [facility]

## 2024-08-23 ENCOUNTER — HOSPITAL ENCOUNTER (OUTPATIENT)
Dept: RADIOLOGY | Facility: HOSPITAL | Age: 69
Discharge: HOME | End: 2024-08-23
Attending: FAMILY MEDICINE
Payer: MEDICARE

## 2024-08-23 ENCOUNTER — TELEPHONE (OUTPATIENT)
Dept: PREADMISSION TESTING | Facility: HOSPITAL | Age: 69
End: 2024-08-23
Payer: MEDICARE

## 2024-08-23 DIAGNOSIS — Z12.31 ENCOUNTER FOR SCREENING MAMMOGRAM FOR BREAST CANCER: ICD-10-CM

## 2024-08-23 PROCEDURE — 77063 BREAST TOMOSYNTHESIS BI: CPT | Mod: TC,PO

## 2024-08-23 PROCEDURE — 77067 SCR MAMMO BI INCL CAD: CPT | Mod: 26,,, | Performed by: RADIOLOGY

## 2024-08-23 PROCEDURE — 77063 BREAST TOMOSYNTHESIS BI: CPT | Mod: 26,,, | Performed by: RADIOLOGY

## 2024-08-23 RX ORDER — FLUCONAZOLE 150 MG/1
150 TABLET ORAL
Qty: 3 TABLET | Refills: 0 | Status: SHIPPED | OUTPATIENT
Start: 2024-08-23 | End: 2024-08-30

## 2024-08-23 RX ORDER — EZETIMIBE 10 MG/1
10 TABLET ORAL NIGHTLY
Qty: 90 TABLET | Refills: 4 | Status: SHIPPED | OUTPATIENT
Start: 2024-08-23

## 2024-08-23 NOTE — TELEPHONE ENCOUNTER
No care due was identified.  [facility] Embedded Care Due Messages. Reference number: [id number].   8/23/2024 3:33:35 PM CDT

## 2024-08-23 NOTE — TELEPHONE ENCOUNTER
Patient needs Diflucan for the Antibiotic you gave her     Patient also wants scopolamine base TD sent to [facility]

## 2024-08-23 NOTE — TELEPHONE ENCOUNTER
----- Message from Mame Richards sent at 8/23/2024  3:19 PM CDT -----  Regarding: cancel and reschedule  Patient called, procedure scheduled for 10/01/2024, she need to change it to 10/08/2024, please call patient to confirm the change    Thanks

## 2024-09-04 ENCOUNTER — TELEPHONE (OUTPATIENT)
Dept: PREADMISSION TESTING | Facility: HOSPITAL | Age: 69
End: 2024-09-04
Payer: MEDICARE

## 2024-09-04 NOTE — TELEPHONE ENCOUNTER
----- Message from Mame Richards sent at 9/4/2024 12:54 PM CDT -----  Regarding: cancel and reschedule  Patient procedure is scheduled for 10/08/2024, she is canceling and would like to reschedule for 09/17/2024, please call patient to reschedule    Thanks

## 2024-09-11 ENCOUNTER — PATIENT MESSAGE (OUTPATIENT)
Dept: FAMILY MEDICINE | Facility: CLINIC | Age: 69
End: 2024-09-11
Payer: MEDICARE

## 2024-09-24 ENCOUNTER — ANESTHESIA (OUTPATIENT)
Dept: ENDOSCOPY | Facility: HOSPITAL | Age: 69
End: 2024-09-24
Payer: MEDICARE

## 2024-09-24 ENCOUNTER — ANESTHESIA EVENT (OUTPATIENT)
Dept: ENDOSCOPY | Facility: HOSPITAL | Age: 69
End: 2024-09-24
Payer: MEDICARE

## 2024-09-24 ENCOUNTER — HOSPITAL ENCOUNTER (OUTPATIENT)
Facility: HOSPITAL | Age: 69
Discharge: HOME | End: 2024-09-24
Attending: FAMILY MEDICINE | Admitting: FAMILY MEDICINE
Payer: MEDICARE

## 2024-09-24 DIAGNOSIS — K63.5 POLYP OF COLON, UNSPECIFIED PART OF COLON, UNSPECIFIED TYPE: ICD-10-CM

## 2024-09-24 DIAGNOSIS — K57.30 DIVERTICULOSIS OF COLON: ICD-10-CM

## 2024-09-24 DIAGNOSIS — Z12.11 COLON CANCER SCREENING: Primary | ICD-10-CM

## 2024-09-24 PROCEDURE — 27201089 HC SNARE, DISP (ANY): Performed by: FAMILY MEDICINE

## 2024-09-24 PROCEDURE — 37000008 HC ANESTHESIA 1ST 15 MINUTES: Performed by: FAMILY MEDICINE

## 2024-09-24 PROCEDURE — 63600175 PHARM REV CODE 636 W HCPCS: Performed by: NURSE ANESTHETIST, CERTIFIED REGISTERED

## 2024-09-24 PROCEDURE — 88305 TISSUE EXAM BY PATHOLOGIST: CPT | Mod: 26,,, | Performed by: PATHOLOGY

## 2024-09-24 PROCEDURE — 45385 COLONOSCOPY W/LESION REMOVAL: CPT | Mod: PT | Performed by: FAMILY MEDICINE

## 2024-09-24 PROCEDURE — 37000009 HC ANESTHESIA EA ADD 15 MINS: Performed by: FAMILY MEDICINE

## 2024-09-24 PROCEDURE — 88305 TISSUE EXAM BY PATHOLOGIST: CPT | Performed by: PATHOLOGY

## 2024-09-24 PROCEDURE — 25000003 PHARM REV CODE 250: Performed by: NURSE ANESTHETIST, CERTIFIED REGISTERED

## 2024-09-24 PROCEDURE — 45385 COLONOSCOPY W/LESION REMOVAL: CPT | Mod: PT,,, | Performed by: FAMILY MEDICINE

## 2024-09-24 RX ORDER — LIDOCAINE HYDROCHLORIDE 10 MG/ML
INJECTION, SOLUTION EPIDURAL; INFILTRATION; INTRACAUDAL; PERINEURAL
Status: DISCONTINUED | OUTPATIENT
Start: 2024-09-24 | End: 2024-09-24

## 2024-09-24 RX ORDER — SODIUM CHLORIDE, SODIUM LACTATE, POTASSIUM CHLORIDE, CALCIUM CHLORIDE 600; 310; 30; 20 MG/100ML; MG/100ML; MG/100ML; MG/100ML
INJECTION, SOLUTION INTRAVENOUS CONTINUOUS
Status: DISCONTINUED | OUTPATIENT
Start: 2024-09-24 | End: 2024-09-24 | Stop reason: HOSPADM

## 2024-09-24 RX ORDER — PROPOFOL 10 MG/ML
VIAL (ML) INTRAVENOUS
Status: DISCONTINUED | OUTPATIENT
Start: 2024-09-24 | End: 2024-09-24

## 2024-09-24 RX ADMIN — PROPOFOL 20 MG: 10 INJECTION, EMULSION INTRAVENOUS at 11:09

## 2024-09-24 RX ADMIN — SODIUM CHLORIDE, POTASSIUM CHLORIDE, SODIUM LACTATE AND CALCIUM CHLORIDE: 600; 310; 30; 20 INJECTION, SOLUTION INTRAVENOUS at 10:09

## 2024-09-24 RX ADMIN — PROPOFOL 100 MG: 10 INJECTION, EMULSION INTRAVENOUS at 11:09

## 2024-09-24 RX ADMIN — LIDOCAINE HYDROCHLORIDE 50 MG: 10 SOLUTION INTRAVENOUS at 11:09

## 2024-09-24 NOTE — LETTER
Sara Stark  [address] [place] [id number]     3 Day Suflave Instructions for Colonoscopy     Date of procedure:10/1/24  Your arrival time will be given to you prior to the day of your procedure.     Your procedure will be performed at [facility] ([facility]). The hospital is located at [address] [address] (off [address] [facility]).       As soon as possible:     your prep from pharmacy and over the counter DULCOLAX LAXATIVE TABLETS, GAS-X, MIRALAX, AND MAGNESIUM CITRATE.     Seven (7) days before colonoscopy: Avoid high fiber foods such as whole wheat or whole grain breads or pasta, raw vegetables or fruit with peels, corn, beans, peas, lentils, nuts, seeds, and popcorn.      Three (3) days before colonoscopy: Begin a full liquid diet. You must only have full liquids for breakfast, lunch, and dinner. Do not consume liquids that are red or purple. No solid foods. You may eat the following items when on a FULL liquid diet: fruit juices (including juices with pulp), custard, pudding, plain ice cream, frozen yogurt, sherbet, fruit ices and popsicles, soup broth, clear sodas, liquid nutritional supplement drinks (Boost, Ensure, or Resource), and tea or coffee with cream or milk.     Two (2) days before colonoscopy: 8:00 AM, Drink 125 grams of MiraLAX mixed with 32 ounces of Gatorade (no red or purple coloring). Finish the Miralax mixture within 1 hour. Stay on the full liquid diet. No solid food.     On the day before your procedure      What You CAN do:     You may have CLEAR LIQUIDS ONLY (Drink at least 16 glasses (8oz glass)-   see below for list.   Liquids That Are OK to Drink:    Water    Sports drinks (Gatorade, Power-Aid)       Coffee or tea (no cream or nondairy creamer)    Clear juices without pulp (apple, white grape)    Gelatin desserts (no fruit or toppings)    Clear soda (sprite, coke, ginger ale)    Chicken broth (until 12 midnight the night before  procedure)      What You CANNOT do:       Do not EAT solid food, drink milk or anything colored red or purple.    Do not drink alcohol.    Do not take oral medications within 1 hour of starting each dose of prep.    No tobacco products, gum chewing, or candy morning of procedure      PLEASE DISREGARD THE INSERT INSTRUCTIONS FROM THE PHARMACY.  How to take prep:  DOSE 1-Day Before Colonoscopy  12:00 pm (NOON) Take four (4) Dulcolax (Bisacodyl) Laxative tablets and 1 simethicone (GAS-X) 125 mg capsule with at least 8 ounces or more of clear liquids.  3:00 pm Drink one bottle of Magnesium Citrate Oral Saline Laxative Solution 10 oz.  6:00 pm Pour one 6 oz. bottle of prep solution into the mixing container. Add cool water to reach the 16-oz. fill line.  Mix well. Drink ALL the contents in the mixing container. Drink 2 more 16 oz. containers of water. Finish the prep mixture and the 2 glasses of water within 1 hour and 30 minutes. Drink a minimum of four (8 oz) glasses of clear liquids before midnight to stay hydrated.  Mix the prep with Crystal Light (no red or purple flavoring), apple juice, or Gatorade (no red or purple) to improve the flavor.   After midnight, nothing to drink or eat except for the bowel prep.      DOSE 2-Day of the Colonoscopy  [place] the time you were instructed:2:00 AM     or     5:00AM     For this dose, REPEAT using the second 6 oz prep solution and mixing container.     After finishing prep, do not drink or eat anything until after the colonoscopy.   You may have a small sip of water with your morning medications. If your stool is brown, orange, or cloudy, your colon is not clean, please call endoscopy staff at [phone].     Endoscopy Scheduling Department at [phone].  Endoscopy Department at [phone].   On-Call Nurse line at [phone].  Financial Services at [phone].  Frequently Asked Questions- Colonoscopy  What are some tips for preparing for a  colonoscopy?  Stay near a toilet after taking the prep, you will have diarrhea and may have cramping with your bowel movements.  For skin irritation or flaring of hemorrhoids from frequent bowel movements and wiping, ease with over-the-counter products like baby wipes, Vaseline, or Tucks pads.  If experiencing nausea from the prep, take a 30-minute break, rinse your mouth, and continue drinking the prep. Dramamine (Meclizine) is available over the counter, take ½ of a tablet (12.5 mg) every 6 hours as needed for nausea. Do not take more than 50 mg in 24 hours.   If a long drive or need a change to the prep direction times, please call the endoscopy department to talk with our staff at [phone].  Females between the ages of 10-55 may be asked for a urine sample (pregnancy test) after you check in for your procedure. Please let staff know before going to the restroom.  Coumadin (WARFARIN), Plavix (CLOPIDOGREL), and Effient (PRASUGREL) MUST be stopped 5 days prior to exam unless discussed with the doctor performing the test. Eliquis (APIXABAN), Savaysa (EDOXABAN), Arixtra (FONDAPARINUX), and Xarelto (RIVAROXABAN) MUST be stopped 2 days prior to exam unless discussed with the doctor performing the test.  Glucagon-like peptide-1 receptor agonists (GLP-1 Fay) medications (semaglutide -OZEMPIC, RYBELSUS, WEGOVY; Dulaglutide- TRULICITY; Liraglutide- SAXENDA; tirzepatide- MOUNJARO) for weight loss or diabetes, MUST be stopped 7 days prior to exam unless discussed with the doctor performing the test.  Weight loss medications such as Phentermine (Adipex/Lomaira) and Diethylpropion (Amfepraone/Tepanil/Tenuate) should be stopped 7 days prior to exam.  You must have a licensed  to bring you home. If using public transportation, you must have an adult come with you.  Do not take any diabetic medications (including insulin) the morning of the exam. If your blood sugar goes below 70, you may drink 2 ounces of clear juice.  "Wait 15 minutes, then recheck your blood sugar. If it isn't going up, you may drink another 2 ounces of clear juice and contact the On-Call Nurse line at [phone].   Continue to take blood pressure, heart, thyroid, lung, seizure, and psychological medications the morning of procedure.     Do I have to drink all the bowel prep and water?             Yes, in addition to drinking plenty of clear liquids. Drinking plenty of clear liquids helps the prep to work and keep you hydrated. Any clear liquid that isn't red or purple. Drink at least 12 (8 oz) glasses of clear liquids or three 32 oz Gatorades by 5PM the day before your procedure. Drink   at least 1 (8 oz) glass of liquid every hour while you're awake. After the first dose of prep, drink an additional four (8 oz) glasses of clear liquids before midnight.  Clear liquids include: Coffee (no cream/milk)  Water  Tea  Clear carbonated drinks (ginger ale, Sprite, or sparkling water)  Gelatin/Jello  Apple, White grape, White Cranberry Juice  Beef/chicken broth/bouillon  Gatorade/Powerade  Lemonade/limeade  Snowballs/slushes  Popsicles  No "Energy" beverages or alcohol. No juices with pulp.  Do not drink red or purple liquids because the dye will stain the walls of your colon and may appear to be a bleed/abnormality.        The first dose of the prep starts to clean out the stool from your colon. The second dose of the prep helps to fully clean out the colon before the procedure.     What are some ways to improve the taste of the prep?  Put the prep solution in the refrigerator to chill before beginning the prep, tastes best cold.  Drinking the prep with a straw.     How will I know that the bowel prep is working? Why is it important to have a good prep or a clean colon before the procedure?  bowel movements are clear or clear yellow.   Colon should be clean as possible so the doctor can see the walls of the colon and find tiny polyps or colon cancer.  If there is " stool in the colon, the doctor cannot move the endoscopy scope through the entire colon and the procedure will be canceled.  If a history of poor bowel prep, constipation, opiate medication use, diabetes, or kidney disease, please let us know; you may require an extended bowel prep to clean your colon.  If brown (including dark orange and cloudy) bowel movements on the morning of your procedure, please call the endoscopy department at [phone] (M-F from 6AM-3PM).       What should I bring to my appointment?  -List of your current medications                                              -Clothing that is easy to put back on after the procedure        -Method of payment        -Your ID         - Insurance card     Leave jewelry and other valuables at home.   Please plan to be at the hospital for 3 - 4 hours.     Why doesn't my appointment time show up in be[facility] or [facility]?  [facility] and [facility] are not set up to show surgical times. You will be called the day before the procedure and told your arrival time.     Where is the Endoscopy department located?  [facility] ([facility]) hospital is located at [address] [address], off [address] ([place] Once on [address], [facility] is the second building (Entrance #2) on the left. Check in for your procedure on the 1st floor at Registration.   [facility], is the 2-story surgery center on the left.  The [place] [address] [place] [address]. Many GPS systems are NOT providing accurate instructions, take [address], from either direction, to [address] and proceed to the eastbound service road, turning between the [facility] and [facility] [place] [facility]. Once at the [facility], look for signs directing you to Hospital/Surgery. Check in for your procedure at  for Hospital/Surgery.

## 2024-09-24 NOTE — ANESTHESIA PREPROCEDURE EVALUATION
09/24/2024  Sara Stark is a 69 y.o., female.      Pre-op Assessment    I have reviewed the Patient Summary Reports.     I have reviewed the Nursing Notes. I have reviewed the NPO Status.   I have reviewed the Medications.     Review of Systems  Anesthesia Hx:  No problems with previous Anesthesia             Denies Family Hx of Anesthesia complications.    Denies Personal Hx of Anesthesia complications.                    Social:  No Alcohol Use, Non-Smoker       Hematology/Oncology:    Oncology Normal    -- Denies Anemia:                                  EENT/Dental:  chronic allergic rhinitis           Cardiovascular:      Denies Hypertension.   Denies MI.     Denies CABG/stent.     Denies CHF.    hyperlipidemia                             Pulmonary:   COPD Asthma     Denies Sleep Apnea.                Renal/:  Renal/ Normal                 Hepatic/GI:  Bowel Prep.    Denies GERD. Denies Liver Disease.  Denies Hepatitis.           Musculoskeletal:  Arthritis   Sjogren's syndrome            Neurological:   CVA    Denies Seizures.    Myalgia, multiple sites  Neuralgia          Peripheral Neuropathy                          Endocrine:  Denies Diabetes. Denies Hypothyroidism.  Denies Hyperthyroidism.         Psych:     Bipolar affective disorder in remission           Physical Exam  General: Well nourished    Airway:  Mallampati: II   Mouth Opening: Normal    Dental:  Edentulous    Chest/Lungs:  Normal Respiratory Rate, Clear to auscultation    Heart:  Rate: Normal  Rhythm: Regular Rhythm    Anesthesia Plan  Type of Anesthesia, risks & benefits discussed:    Anesthesia Type: MAC  Intra-op Monitoring Plan: Standard ASA Monitors  Induction:  IV  Informed Consent: Informed consent signed with the Patient and all parties understand the risks and agree with anesthesia plan.  All questions answered.    ASA Score: 2  Day of Surgery Review of History & Physical: H&P Update referred to the surgeon/provider.    Ready For Surgery From Anesthesia Perspective.   .

## 2024-09-24 NOTE — TRANSFER OF CARE
"Anesthesia Transfer of Care Note    Patient: Sara Stark    Procedure(s) Performed: Procedure(s) (LRB):  COLONOSCOPY okay to hold plavix x 5 days per Dr. Oliver Connor 8/22/24, in chart. (N/A)    Patient location: PACU    Anesthesia Type: MAC    Transport from OR: Transported from OR on room air with adequate spontaneous ventilation    Post pain: adequate analgesia    Post assessment: no apparent anesthetic complications and tolerated procedure well    Post vital signs: stable    Level of consciousness: sedated    Nausea/Vomiting: no nausea/vomiting    Complications: none    Transfer of care protocol was followed    Last vitals: Visit Vitals  BP (!) 145/66 (BP Location: Left arm, Patient Position: Lying)   Pulse 76   Temp 37.1 °C (98.8 °F) (Temporal)   Resp 20   Ht 5' 7" (1.702 m)   Wt 72.6 kg (160 lb)   SpO2 96%   Breastfeeding No   BMI 25.06 kg/m²     "

## 2024-09-24 NOTE — PLAN OF CARE
Dr Naylor previously spoke to pt's  to discuss findings. VSS. No  Pain, no GI bleeding. Pt to be discharged from unit

## 2024-09-24 NOTE — ANESTHESIA POSTPROCEDURE EVALUATION
Anesthesia Post Evaluation    Patient: Sara Stark    Procedure(s) Performed: Procedure(s) (LRB):  COLONOSCOPY okay to hold plavix x 5 days per Dr. Oliver Connor 8/22/24, in chart. (N/A)    Final Anesthesia Type: MAC      Patient location during evaluation: PACU  Patient participation: Yes- Able to Participate  Level of consciousness: awake  Post-procedure vital signs: reviewed and stable  Pain management: adequate  Airway patency: patent    PONV status at discharge: No PONV  Anesthetic complications: no      Cardiovascular status: blood pressure returned to baseline and hemodynamically stable  Respiratory status: unassisted and spontaneous ventilation  Hydration status: euvolemic  Follow-up not needed.                  No case tracking events are documented in the log.      Pain/Toño Score: No data recorded

## 2024-09-24 NOTE — H&P
Short Stay Endoscopy History and Physical    PCP - Oliver Connor MD    Procedure - Colonoscopy  ASA - 2  Mallampati - per anesthesia  History of Anesthesia problems - no  Family history Anesthesia problems -  no     HPI:  This is a 69 y.o. female here for evaluation of :   Active Hospital Problems    Diagnosis  POA    *Colon cancer screening [Z12.11]  Not Applicable      Resolved Hospital Problems   No resolved problems to display.         Health Maintenance         Date Due Completion Date    Colorectal Cancer Screening Never done ---    RSV Vaccine (Age 60+ and Pregnant patients) (1 - 1-dose 60+ series) Never done ---    Influenza Vaccine (1) 09/01/2024 12/8/2021    COVID-19 Vaccine (4 - 2023-24 season) 09/01/2024 11/22/2021    TETANUS VACCINE 08/16/2025 (Originally 1/31/1974) ---    Shingles Vaccine (1 of 2) 08/16/2025 (Originally 1/31/2006) ---    Pneumococcal Vaccines (Age 65+) (2 of 2 - PCV) 08/16/2025 (Originally 7/28/2017) 7/28/2016    Mammogram 08/23/2025 8/23/2024    Hemoglobin A1c (Diabetic Prevention Screening) 08/16/2027 8/16/2024    DEXA Scan 08/16/2027 8/16/2024    Lipid Panel 08/16/2029 8/16/2024            Screening - Yes  History of polyps - allergic work     Diarrhea - no  Anemia - no  Blood in stools - no  Abdominal pain - no  Other - no    ROS:  CONSTITUTIONAL: Denies weight change,  fatigue, fevers, chills, night sweats.  CARDIOVASCULAR: Denies chest pain, shortness of breath, orthopnea and edema.  RESPIRATORY: Denies cough, hemoptysis, dyspnea, and wheezing.  GI: See HPI.    Medical History:   Past Medical History:   Diagnosis Date    Bipolar disorder, unspecified     COPD (chronic obstructive pulmonary disease)        Surgical History:   Past Surgical History:   Procedure Laterality Date    CHOLECYSTECTOMY  Unknown    HYSTERECTOMY  1996    SPINE SURGERY  1984    Lumbar laminectomy L4-L5    TONSILLECTOMY  Child    TUBAL LIGATION  1984       Family History:   Family History   Problem  Relation Name Age of Onset    Alcohol abuse Mother Sara Rae     Cancer Mother Sara Rae     COPD Mother Sara Rae     Depression Mother Sara Rae     Heart disease Mother Sara Rae     Hyperlipidemia Mother Sara Rae     Hypertension Mother Sara Rae     Vision loss Mother Sara Rae     Heart disease Father Rob Jain     Hyperlipidemia Father Rob Jain        Social History:   Social History     Tobacco Use    Smoking status: Never    Tobacco comments:     Prior smoker 1 ppd quit 8 yrs ago   Substance Use Topics    Alcohol use: Not Currently     Alcohol/week: 1.0 standard drink of alcohol     Types: 1 Glasses of wine per week     Comment: Once a month at most    Drug use: Never       Allergies:   Review of patient's allergies indicates:   Allergen Reactions    Levofloxacin in d5w Blisters and Swelling    Levofloxacin Other (See Comments)    Cephalexin Hives, Itching, Nausea And Vomiting and Rash       Medications:   No current facility-administered medications on file prior to encounter.     Current Outpatient Medications on File Prior to Encounter   Medication Sig Dispense Refill    AUSTEDO 12 mg Tab       clonazePAM (KLONOPIN) 0.25 MG TbDL 0.25 mg once daily.      DULoxetine (CYMBALTA) 20 MG capsule Take 20 mg by mouth.      DULoxetine (CYMBALTA) 30 MG capsule Take 30 mg by mouth.      fluticasone-umeclidin-vilanter (TRELEGY ELLIPTA) 100-62.5-25 mcg DsDv Inhale 1 puff into the lungs once daily. 60 each 12    folic acid (FOLVITE) 1 MG tablet       gabapentin (NEURONTIN) 100 MG capsule Take 2 capsules (200 mg total) by mouth every evening. 180 capsule 4    GEODON 20 mg Cap       krill-om-3-dha-epa-phospho-ast (KRILL OIL) 890-675-26-75 mg Cap       lamoTRIgine (LAMICTAL) 200 MG tablet Take 1 tablet every day by oral route as directed.      levOCARNitine (L-CARNITINE) 500 mg Cap       loratadine (CLARITIN) 10 mg tablet Take 1  tablet every day by oral route in the evening for 30 days.      montelukast (SINGULAIR) 10 mg tablet Take 1 tablet (10 mg total) by mouth every evening. 90 tablet 4    omeprazole (PRILOSEC) 20 MG capsule Take 20 mg by mouth.      pilocarpine (SALAGEN) 5 MG Tab Take 5 mg by mouth 2 (two) times daily.      propranoloL (INDERAL) 10 MG tablet       QUEtiapine (SEROQUEL) 300 MG Tab       vitamin B complex (SUPER B COMPLEX-B-12 ORAL)       albuterol (PROVENTIL) 2.5 mg /3 mL (0.083 %) nebulizer solution Take 2.5 mg by nebulization 4 (four) times daily.      albuterol sulfate (PROAIR DIGIHALER) 90 mcg/actuation aebs Inhale 2 puffs into the lungs 4 (four) times daily. 1 each 12    clonazePAM (KLONOPIN) 0.5 MG tablet Take 0.5 mg by mouth 2 (two) times daily as needed for Anxiety.      cyanocobalamin (VITAMIN B-12) 1000 MCG tablet Take 1 tablet every day by oral route as directed.      estradioL (ESTRACE) 1 MG tablet       PLAVIX 75 mg tablet Take 1 tablet (75 mg total) by mouth once daily. 90 tablet 4    SCOPOLAMINE BASE TD       zolpidem (AMBIEN) 10 mg Tab       [DISCONTINUED] peg 3350-sod sulf,chlr-pot-mag (SUFLAVE) 178.7-7.3-0.5 gram SolR Take 2 Bottles by mouth As instructed (Use as directed in facility directions). 1 each 0       Physical Exam:  Vital Signs:   Vitals:    09/24/24 1032   BP: (!) 145/66   Pulse: 76   Resp: 20   Temp: 98.8 °F (37.1 °C)     General Appearance: Well appearing in no acute distress  ENT: OP clear  Chest: CTA B  CV: RRR, no m/r/g  Abd: s/nt/nd/nabs  Ext: no edema    Labs:Reviewed    IMP:  Active Hospital Problems    Diagnosis  POA    *Colon cancer screening [Z12.11]  Not Applicable      Resolved Hospital Problems   No resolved problems to display.         Plan:   I have explained the risks and benefits of colonoscopy to the patient including but not limited to bleeding, perforation, infection, and death. The patient wishes to proceed.

## 2024-09-24 NOTE — [employer] PATIENT INSTRUCTIONS
Discharge Summary/Instructions after an Endoscopic Procedure  Patient Name: Sara Stark  Patient MRN: [id number]  Patient YOB: 1955 Tuesday, September 24, 2024 Sanjiv Naylor MD  Dear patient,  As a result of recent federal legislation (The Federal Cures Act), you may   receive lab or pathology results from your procedure in your [facility]   account before your physician is able to contact you. Your physician or   their representative will relay the results to you with their   recommendations at their soonest availability.  Thank you,  RESTRICTIONS:  During your procedure today, you received medications for sedation.  These   medications may affect your judgment, balance and coordination.  Therefore,   for 24 hours, you have the following restrictions:   - DO NOT drive a car, operate machinery, make legal/financial decisions,   sign important papers or drink alcohol.    ACTIVITY:  Today: no heavy lifting, straining or running due to procedural   sedation/anesthesia.  The following day: return to full activity including work.  DIET:  Eat and drink normally unless instructed otherwise.     TREATMENT FOR COMMON SIDE EFFECTS:  - Mild abdominal pain, nausea, belching, bloating or excessive gas:  rest,   eat lightly and use a heating pad.  - Sore Throat: treat with throat lozenges and/or gargle with warm salt   water.  - Because air was used during the procedure, expelling large amounts of air   from your rectum or belching is normal.  - If a bowel prep was taken, you may not have a bowel movement for 1-3 days.    This is normal.  SYMPTOMS TO WATCH FOR AND REPORT TO YOUR PHYSICIAN:  1. Abdominal pain or bloating, other than gas cramps.  2. Chest pain.  3. Back pain.  4. Signs of infection such as: chills or fever occurring within 24 hours   after the procedure.  5. Rectal bleeding, which would show as bright red, maroon, or black stools.   (A tablespoon of blood from the rectum is not serious, especially  if   hemorrhoids are present.)  6. Vomiting.  7. Weakness or dizziness.  GO DIRECTLY TO THE NEAREST EMERGENCY ROOM IF YOU HAVE ANY OF THE FOLLOWING:      Difficulty breathing              Chills and/or fever over 101 F   Persistent vomiting and/or vomiting blood   Severe abdominal pain   Severe chest pain   Black, tarry stools   Bleeding- more than one tablespoon   Any other symptom or condition that you feel may need urgent attention  Your doctor recommends these additional instructions:  If any biopsies were taken, your doctors clinic will contact you in 1 to 2   weeks with any results.  - Patient has a contact number available for emergencies.  The signs and   symptoms of potential delayed complications were discussed with the   patient.  Return to normal activities tomorrow.  Written discharge   instructions were provided to the patient.   - Resume previous diet.   - Continue present medications.   - Await pathology results.   - Repeat colonoscopy in 5 years for surveillance.   - Telephone my office for pathology results in 2 weeks.   - Discharge patient to home (via wheelchair).  For questions, problems or results please call your physician Sanjiv Naylor MD at Work:  [phone]  If you have any questions about the above instructions, call the GI   department at [phone] or call the endoscopy unit at [phone]   from 7am until 3 pm.  [facility] [place], EMERGENCY ROOM PHONE NUMBER:   [phone]  IF A COMPLICATION OR EMERGENCY SITUATION ARISES AND YOU ARE UNABLE TO REACH   YOUR PHYSICIAN - GO DIRECTLY TO THE EMERGENCY ROOM.  I have read or have had read to me these discharge instructions for my   procedure and have received a written copy.  I understand these   instructions and will follow-up with my physician if I have any questions.     __________________________________       _____________________________________  Nurse Signature                                           Patient/Designated   Responsible Party Signature  Sanjiv Naylor MD  9/24/2024 11:26:39 AM  This report has been verified and signed electronically.  Dear patient,  As a result of recent federal legislation (The Federal Cures Act), you may   receive lab or pathology results from your procedure in your [facility]   account before your physician is able to contact you. Your physician or   their representative will relay the results to you with their   recommendations at their soonest availability.  Thank you,  [employer]

## 2024-09-25 VITALS
HEART RATE: 65 BPM | DIASTOLIC BLOOD PRESSURE: 60 MMHG | RESPIRATION RATE: 18 BRPM | BODY MASS INDEX: 25.11 KG/M2 | TEMPERATURE: 98 F | OXYGEN SATURATION: 97 % | WEIGHT: 160 LBS | HEIGHT: 67 IN | SYSTOLIC BLOOD PRESSURE: 112 MMHG

## 2024-09-26 LAB
FINAL PATHOLOGIC DIAGNOSIS: NORMAL
GROSS: NORMAL
Lab: NORMAL

## 2024-09-26 NOTE — PROGRESS NOTES
Dear Oliver Connor MD,    I recently cared for Sara Stark and performed an endoscopy.  Tissue was sent for pathology evaluation and I will have a letter written to ask the patient to repeat the colonoscopy in 5 years.  The pathology showed that there was adenomatous tissue present.  Thank you for allowing me to participate in the care of your patient.  Please call me for any questions that you might have.      Dr. Sanjiv Naylor  [phone] [facility]  [phone] office      NURSING STAFF:Please  inform the patient that I reviewed the recent pathology obtained at the time of colonoscopy.    The results showed that there was adenomatous tissue present which is benign and based on that, I recommend that the patient have a repeat colonoscopy performed in 5 years.     If the patient has MyChart, this message has been sent to them.  Confirm that they read the note.  If not, copy the information and print a letter to send to the patient at this time.  Confirm that a notation to the PCP was done.      Dear Sara Stark,    This is to inform you that I have reviewed your recent colonoscopy pathology.  The results showed that you had adenomatous tissue present which is benign and based on that, I recommend that you have a repeat colonoscopy performed in 5 years.      Dr. Sanjiv Naylor  [phone]

## 2024-10-03 ENCOUNTER — TELEPHONE (OUTPATIENT)
Dept: PREADMISSION TESTING | Facility: HOSPITAL | Age: 69
End: 2024-10-03
Payer: MEDICARE

## 2024-10-09 ENCOUNTER — OFFICE VISIT (OUTPATIENT)
Dept: OTOLARYNGOLOGY | Facility: CLINIC | Age: 69
End: 2024-10-09
Payer: MEDICARE

## 2024-10-09 VITALS — BODY MASS INDEX: 26.3 KG/M2 | WEIGHT: 167.56 LBS | HEIGHT: 67 IN

## 2024-10-09 DIAGNOSIS — M35.00 SJOGREN'S SYNDROME, WITH UNSPECIFIED ORGAN INVOLVEMENT: Primary | ICD-10-CM

## 2024-10-09 DIAGNOSIS — R60.9 PAROTID SWELLING: ICD-10-CM

## 2024-10-09 PROCEDURE — 99203 OFFICE O/P NEW LOW 30 MIN: CPT | Mod: S$PBB,,, | Performed by: OTOLARYNGOLOGY

## 2024-10-09 PROCEDURE — 99214 OFFICE O/P EST MOD 30 MIN: CPT | Mod: PBBFAC,PO | Performed by: OTOLARYNGOLOGY

## 2024-10-09 PROCEDURE — 99999 PR PBB SHADOW E&M-EST. PATIENT-LVL IV: CPT | Mod: PBBFAC,,, | Performed by: OTOLARYNGOLOGY

## 2024-10-09 NOTE — PROGRESS NOTES
Subjective:       Patient ID: Sara Stark is a 69 y.o. female.    Chief Complaint: parotitis     Sara is here for parotid gland swelling.  Patient has a history of Sjogren's and has recurrent left parotid swelling. Happens about 2 times per yr.  Does occur on right but less frequently.  Last episode was 4 weeks ago. Does respond to abx +/- steroids.  She takes Salagen daily (although dosed twice daily.)     Previous ENT Dr. Cochran in [place]. Getting established here.  Pertinent medical issues: BipolarCOPD, Neuropathy, tardive dys    Patient validated questionnaires (if applicable):      %            No data to display                   No data to display                   No data to display                     Social History     Tobacco Use   Smoking Status Never   Smokeless Tobacco Not on file   Tobacco Comments    Prior smoker 1 ppd quit 8 yrs ago     Social History     Substance and Sexual Activity   Alcohol Use Not Currently    Alcohol/week: 1.0 standard drink of alcohol    Types: 1 Glasses of wine per week    Comment: Once a month at most          Objective:        Constitutional:   Vital signs are normal. She appears well-developed and well-nourished.     Head:  Normocephalic and atraumatic.     Ears:  Hearing normal to normal and whispered voice; external ear normal without scars, lesions, or masses; ear canal, tympanic membrane, and middle ear normal..     Nose:  Nose normal including turbinates, nasal mucosa, sinuses and nasal septum.     Mouth/Throat  Oropharynx clear and moist without lesions or asymmetry. Xerostomia present.     Neck:  Neck normal without thyromegaly masses, asymmetry, normal tracheal structure, crepitus, and tenderness.         Tests / Results:  none    Assessment:       1. Sjogren's syndrome, with unspecified organ involvement    2. Parotid swelling          Plan:         Reassurance provided  She has good understanding Sjogren's. Continue Salagen and can incr to BID.    Can follow clinically. She had previous imaging monitoring for any growth - was 2 mm based on report which isn't anything of concern- imaging not necessary but can image in a yr if desired  RTC 1 yr

## 2024-12-14 ENCOUNTER — PATIENT MESSAGE (OUTPATIENT)
Dept: FAMILY MEDICINE | Facility: CLINIC | Age: 69
End: 2024-12-14
Payer: MEDICARE

## 2024-12-16 RX ORDER — OMEPRAZOLE 20 MG/1
20 CAPSULE, DELAYED RELEASE ORAL DAILY
Qty: 90 CAPSULE | Refills: 3 | Status: SHIPPED | OUTPATIENT
Start: 2024-12-16

## 2024-12-16 NOTE — TELEPHONE ENCOUNTER
No care due was identified.  [facility] Embedded Care Due Messages. Reference number: [id number].   12/16/2024 7:30:04 AM CST

## 2025-02-20 ENCOUNTER — TELEPHONE (OUTPATIENT)
Dept: FAMILY MEDICINE | Facility: CLINIC | Age: 70
End: 2025-02-20
Payer: MEDICARE

## 2025-02-20 NOTE — TELEPHONE ENCOUNTER
Patient came in to clinic with shortness of breath could barely talk chest pain o2 was 93 patient advised to go to ER pt verbalized understanding. Patient  drove patient across the street to [place] [facility].

## 2025-02-21 DIAGNOSIS — Z00.00 ENCOUNTER FOR MEDICARE ANNUAL WELLNESS EXAM: ICD-10-CM

## 2025-02-24 DIAGNOSIS — J44.89 COPD WITH ASTHMA: ICD-10-CM

## 2025-02-24 RX ORDER — ALBUTEROL SULFATE 90 UG/1
2 INHALANT RESPIRATORY (INHALATION) 4 TIMES DAILY
Qty: 3 EACH | Refills: 1 | Status: SHIPPED | OUTPATIENT
Start: 2025-02-24

## 2025-02-24 NOTE — TELEPHONE ENCOUNTER
No care due was identified.  [facility] Embedded Care Due Messages. Reference number: [id number].   2/24/2025 1:45:31 PM CST

## 2025-02-24 NOTE — TELEPHONE ENCOUNTER
----- Message from [email or web address] sent at 2/24/2025 12:28 PM CST -----  Contact: self  ..Type:  RX Refill RequestWho Called: ..Sara Millanill or New Rx:Refill RX Name and Strength: albuterol sulfate (PROAIR DIGIHALER) 90 mcg/actuation aebsHow is the patient currently taking it? (ex. 1XDay):Is this a 30 day or 90 day RX:Preferred Pharmacy with phone number:.[id number] - [place], [place] - [address]  AT [facility] [id number] [facility] [address]Tyxmg: [phone] Fax: [phone] or Mail Order:local Ordering Provider:Geeta Would the patient rather a call back or a response via [facility]? Call back Best Call Back Number:.[phone] Information:

## 2025-02-27 ENCOUNTER — E-VISIT (OUTPATIENT)
Dept: FAMILY MEDICINE | Facility: CLINIC | Age: 70
End: 2025-02-27
Payer: MEDICARE

## 2025-02-27 DIAGNOSIS — J44.1 COPD EXACERBATION: Primary | ICD-10-CM

## 2025-02-27 RX ORDER — PREDNISONE 20 MG/1
20 TABLET ORAL DAILY
Qty: 5 TABLET | Refills: 0 | Status: SHIPPED | OUTPATIENT
Start: 2025-02-27 | End: 2025-03-04

## 2025-02-27 NOTE — PROGRESS NOTES
Patient ID: Sara Stark is a 70 y.o. female.    Chief Complaint: General Illness (Entered automatically based on patient selection in [employer].)    The patient initiated a request through [employer] on 2/27/2025 for evaluation and management with a chief complaint of General Illness (Entered automatically based on patient selection in [employer].)     I evaluated the questionnaire submission on 02/27/2025  .    [facility] Evisit [facility]group-Chronic Conditions    2/27/2025  3:19 PM CST - Filed by Patient   What do you need help with? Lung Disease (COPD)   Do you agree to participate in an E-Visit? Yes   DO NOT START THIS E-VISIT IF YOU HAVE AN OXYGEN SATURATION BELOW 90% OR YOUR HEART RATE IS ABOVE 100 PERSISTENTLY.    If you have any of the following symptoms, please present to your local emergency room or call 911: I acknowledge   What is the main issue you would like addressed today? I have had a COPD exacerbation since i had the flu three weeks ago. I am still having shortness of breath and am taking Abx and neb txs.   still productive but clear. In the oast my pulmonologist has Rx prednisone 5 mg a day for 4-6 weeks. Will pls.   Are you: Having a new or worsening Asthma or COPD problem   Do you have asthma? Yes   Have you had any of the following: Bronchitis;  COPD or emphysema   In the past week, how often have you wheezed? Everyday   In the past week, how often have you had shortness of breath? Everyday   In the past week, how often have you coughed? Everyday   In the past week, have you had more sputum or phlegm (mucus)? Yes   Describe the appearance of your sputum or phlegm (mucus). Was green then yellow but now clear   How severe is your worst asthma or COPD symptom? Severe   In the past week, how many times have you used a rescue inhaler, such as albuterol? 4   Do you currently take any preventative medications, such as inhaled steroids? Yes   Do you have trouble taking your medication as prescribed? No    Have you been exposed to any of your known asthma or COPD triggers in the past week? Yes   What triggers have you been exposed to? Other   What other triggers have you been exposed to? The flu three weeks ago   What other symptoms do you have? Fatigue;  Other   Describe your symptoms: No fever. Good O2 sats. Shortness of breath on exertion   Provide any additional information you feel is important. Seen in ER during week after flu   Please attach any relevant images or files    Are you able to take your vital signs? Yes   Systolic Blood Pressure: 121   Diastolic Blood Pressure: 86   Weight: 167   Height: 68   Pulse: 82   Temperature: 98.6   Respiration rate: 22   Pulse Oxygen: 93   How long have you had asthma? More than one year   How would you describe your asthma? Moderate   How often do you experience asthma episodes? More than twice per week but not everyday   Do you have any of the following symptoms? Fatigue   Do you smoke? No   Have you ever smoked? I smoked in the past, but quit   Are there people you know with similar symptoms? Yes   Are you having difficulty breathing? Yes   Describe your difficulty in breathing. On exertion   Is your asthma worse when you are exposed to pollen, dust, or other things in the environment? Yes   Have you been treated for asthma in the past? Yes   What treatments have worked in the past? What has not worked? For flareups, low dose prednsone for several weeks. When not caused by the flu i achieve clear breath sounds and improved oulmonary function tests   Have you ever been diagnosed with bronchitis, or lung disease? Yes   Enter a few details about your earlier diagnosis and treatment. 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 I also use Trilegy once daily.   Anything else you would like to add? A temporary trial of prednisone for 4-6 weeks usually results in positive outcomes   I cough. Most of the time   My chest is full of phlegm (mucus). Most of the time   My chest feels very tight.  Almost never   When I walk up a hill or one flight of stairs, I am very breathless. Most of the time   I am very limited doing activities at home. Most of the time   I am confident leaving my home despite my lung condition. Most of the time   I sleep soundly. All of the time   I have lots of energy. Some of the time   CAT Score (Impact Level) (range: 0 - 40)  5 !          Encounter Diagnosis   Name Primary?    COPD exacerbation Yes        Orders Placed This Encounter   Procedures    Ambulatory referral/consult to Pulmonology     Standing Status:   Future     Expected Date:   3/6/2025     Expiration Date:   3/27/2026     Referral Priority:   Routine     Referral Type:   Consultation     Referral Reason:   Specialty Services Required     Requested Specialty:   Pulmonary Disease     Number of Visits Requested:   1      Medications Ordered This Encounter   Medications    predniSONE (DELTASONE) 20 MG tablet     Sig: Take 1 tablet (20 mg total) by mouth once daily. for 5 days     Dispense:  5 tablet     Refill:  0        Follow up in about 1 week (around 3/6/2025), or if symptoms worsen or fail to improve, for Follow-up on condition.      E-Visit Time Tracking:    Day 1 Time (in minutes): 7    Total Time (in minutes): 7

## 2025-03-04 ENCOUNTER — PATIENT MESSAGE (OUTPATIENT)
Dept: PULMONOLOGY | Facility: CLINIC | Age: 70
End: 2025-03-04
Payer: MEDICARE

## 2025-03-24 DIAGNOSIS — Z86.73 HISTORY OF STROKE: ICD-10-CM

## 2025-03-24 RX ORDER — CLOPIDOGREL BISULFATE 75 MG/1
75 TABLET, FILM COATED ORAL DAILY
Qty: 90 TABLET | Refills: 4 | Status: SHIPPED | OUTPATIENT
Start: 2025-03-24

## 2025-03-24 NOTE — TELEPHONE ENCOUNTER
No care due was identified.  [facility] Embedded Care Due Messages. Reference number: [id number].   3/24/2025 2:28:42 PM CDT

## 2025-03-24 NOTE — TELEPHONE ENCOUNTER
No care due was identified.  [facility] Embedded Care Due Messages. Reference number: [id number].   3/24/2025 6:25:36 PM CDT

## 2025-03-25 RX ORDER — ALBUTEROL SULFATE 90 UG/1
INHALANT RESPIRATORY (INHALATION)
Refills: 0 | OUTPATIENT
Start: 2025-03-25

## 2025-03-25 NOTE — TELEPHONE ENCOUNTER
[facility] Note  Quick DC. Inappropriate Request   Refill request requires further review by MD: NO   Medication Therapy Plan: Pharmacy is requesting new script(s) for the following medications without required information, (sig/ frequency/qty/etc)     [facility] action(s):  Quick Discontinue      Duplicate Pended Encounter(s)/ Last Prescribed Details:    Pharmacies have been requesting medications for patients without required information, (sig, frequency, qty, etc.). In addition, requests are sent for medication(s) pt. are currently not taking, and medications patients have never taken.    We have spoken to the pharmacies about these request types and advised their teams previously that we are unable to assess these New Script requests and require all details for these requests. This is a known issue and has been reported.        Medication related problems are not assessed for QDC.   Medication Reconciliation Completed? NO Were there pending details that required adjustment? NO     Automatic Epic Generated Protocol Data Below:   Requested Prescriptions     Pending Prescriptions Disp Refills    albuterol (PROVENTIL/VENTOLIN HFA) 90 mcg/actuation inhaler [Pharmacy Med Name: ALBUTEROL (PROAIR) HFA INHALER 90MCG]  0              Appointments      Date Provider   Last Visit   2/27/2025 Oliver Connor MD   Next Visit   Visit date not found Oliver Connor MD        Note composed:9:29 AM 03/25/2025

## 2025-04-03 ENCOUNTER — TELEPHONE (OUTPATIENT)
Dept: PULMONOLOGY | Facility: CLINIC | Age: 70
End: 2025-04-03
Payer: MEDICARE

## 2025-04-03 DIAGNOSIS — Z86.73 HISTORY OF STROKE: ICD-10-CM

## 2025-04-03 RX ORDER — CLOPIDOGREL BISULFATE 75 MG/1
75 TABLET, FILM COATED ORAL DAILY
Qty: 90 TABLET | Refills: 4 | Status: SHIPPED | OUTPATIENT
Start: 2025-04-03 | End: 2025-04-04 | Stop reason: SDUPTHER

## 2025-04-03 NOTE — TELEPHONE ENCOUNTER
----- Message from Jane sent at 4/3/2025  3:56 PM CDT -----  Contact: Sara  Type: Appointment RequestCaller is requesting an appointment.  Name of Caller:Deniz:pt is calling to be scheduled from referral Would the patient rather a call back or a response via [employer]? callBest Call Back Number:[phone] Information:

## 2025-04-03 NOTE — TELEPHONE ENCOUNTER
No care due was identified.  [facility] Embedded Care Due Messages. Reference number: [id number].   4/03/2025 11:02:39 AM CDT

## 2025-04-04 ENCOUNTER — E-VISIT (OUTPATIENT)
Dept: FAMILY MEDICINE | Facility: CLINIC | Age: 70
End: 2025-04-04
Payer: MEDICARE

## 2025-04-04 DIAGNOSIS — Z86.73 HISTORY OF STROKE: ICD-10-CM

## 2025-04-04 DIAGNOSIS — Z79.899 ENCOUNTER FOR LONG-TERM (CURRENT) USE OF MEDICATIONS: Primary | ICD-10-CM

## 2025-04-04 RX ORDER — CLOPIDOGREL BISULFATE 75 MG/1
75 TABLET, FILM COATED ORAL DAILY
Qty: 90 TABLET | Refills: 4 | Status: SHIPPED | OUTPATIENT
Start: 2025-04-04

## 2025-04-04 NOTE — PROGRESS NOTES
Patient ID: Sara Stark is a 70 y.o. female.    Chief Complaint: General Illness (Entered automatically based on patient selection in [employer].)    The patient initiated a request through [employer] on 4/4/2025 for evaluation and management with a chief complaint of General Illness (Entered automatically based on patient selection in [employer].)     I evaluated the questionnaire submission on 04/04/2025  .    [facility] Peq Evisit Supergroup-Medication    4/4/2025 12:30 PM CDT - Filed by Patient   What do you need help with? Medication Request   Do you agree to participate in an E-Visit? Yes   If you have any of the following symptoms, please present to your local emergency room or call 911:  I acknowledge   Medication requests for narcotics will not be addressed via an E-Visit.  Please schedule an appointment. I acknowledge   Do you want to address a new or existing medication? I would like to address a medication I currently take   What is the main issue you would like addressed today? [facility] says they need the provider to contact them regarding the prescription for plavix please   Would you like to change or continue your medication? Continue medication   What medication would you like to continue?  Plavix   Are you taking it as prescribed? Yes    What medical condition is the  medication intended to treat? Past stroke prevention   Is the medication helping your condition? Yes   Are you having any side effects from the medication? No   Provide any additional information you feel is important.    Please attach any relevant images or files    Are you able to take your vital signs? Yes   Systolic Blood Pressure: 125   Diastolic Blood Pressure: 84   Weight: 167   Height: 67   Pulse: 80   Temperature: 97.8   Respiration rate: 20   Pulse Oxygen:          Encounter Diagnoses   Name Primary?    History of stroke     Encounter for long-term (current) use of medications Yes        No orders of the defined types  were placed in this encounter.     Medications Ordered This Encounter   Medications    PLAVIX 75 mg tablet     Sig: Take 1 tablet (75 mg total) by mouth once daily.     Dispense:  90 tablet     Refill:  4        Follow up in about 3 months (around 7/4/2025), or if symptoms worsen or fail to improve, for Med refills, LAB RESULTS.      E-Visit Time Tracking:    Day 1 Time (in minutes): 5    Total Time (in minutes): 5

## 2025-04-04 NOTE — PATIENT INSTRUCTIONS
Follow up in about 3 months (around 7/4/2025), or if symptoms worsen or fail to improve, for Med refills, LAB RESULTS.     Dear patient,   As a result of recent federal legislation (The Federal Cures Act), you may receive lab or pathology results from your visit in your [facility] account before your physician is able to contact you. Your physician or their representative will relay the results to you with their recommendations at their soonest availability.     If no improvement in symptoms or symptoms worsen, please be advised to call MD, follow-up at clinic and/or go to ER if becomes severe.    Oliver Connor M.D.        We Offer TELEHEALTH & Same Day Appointments!   Book your Telehealth appointment with me through my nurse or   Clinic appointments on [facility]!    [address] [place] [address]    Office: [phone]   FAX: [phone]    Check out my Facebook Page and Follow Me at: [email or web address]    Check out my website at [email or web address] by clicking on: [email or web address]    To Schedule appointments online, go to [facility]: [email or web address]

## 2025-04-07 ENCOUNTER — TELEPHONE (OUTPATIENT)
Dept: FAMILY MEDICINE | Facility: CLINIC | Age: 70
End: 2025-04-07
Payer: MEDICARE

## 2025-04-07 NOTE — TELEPHONE ENCOUNTER
----- Message from Yosef Ramirez sent at 4/4/2025  4:24 PM CDT -----  Contact: Sara    ----- Message -----  From: Monica Alanis  Sent: 4/4/2025   3:10 PM CDT  To: Geeta Boyd Staff    .TYPE: Patient Call BackWho called:PatientWhat is the request in detail:  Patient called in to request for the provider to give [facility] a call for medication  PLAVIX 75 mg tablet Can the clinic reply by [facility]?   Would the patient rather a call back or a response via [facility] call back number:[phone]

## 2025-04-13 ENCOUNTER — E-VISIT (OUTPATIENT)
Dept: FAMILY MEDICINE | Facility: CLINIC | Age: 70
End: 2025-04-13
Payer: MEDICARE

## 2025-04-13 DIAGNOSIS — A49.9 BACTERIAL INFECTION: ICD-10-CM

## 2025-04-13 DIAGNOSIS — J40 BRONCHITIS: Primary | ICD-10-CM

## 2025-04-14 RX ORDER — DOXYCYCLINE 100 MG/1
100 CAPSULE ORAL EVERY 12 HOURS
Qty: 20 CAPSULE | Refills: 0 | Status: SHIPPED | OUTPATIENT
Start: 2025-04-14

## 2025-04-14 RX ORDER — BENZONATATE 200 MG/1
200 CAPSULE ORAL 3 TIMES DAILY PRN
Qty: 30 CAPSULE | Refills: 0 | Status: SHIPPED | OUTPATIENT
Start: 2025-04-14 | End: 2025-04-24

## 2025-04-14 RX ORDER — PREDNISONE 20 MG/1
20 TABLET ORAL DAILY
Qty: 5 TABLET | Refills: 0 | Status: SHIPPED | OUTPATIENT
Start: 2025-04-14 | End: 2025-04-19

## 2025-04-14 NOTE — PROGRESS NOTES
Patient ID: Sara Stark is a 70 y.o. female.    Chief Complaint: General Illness (Entered automatically based on patient selection in [employer].)    The patient initiated a request through [employer] on 4/13/2025 for evaluation and management with a chief complaint of General Illness (Entered automatically based on patient selection in [employer].)     I evaluated the questionnaire submission on 04/14/2025  .    [facility] Peq Evisit Supergroup-Chronic Conditions    4/13/2025  9:09 PM CDT - Filed by Patient   What do you need help with? Asthma   Do you agree to participate in an E-Visit? Yes   DO NOT START THIS E-VISIT IF YOU HAVE AN OXYGEN SATURATION BELOW 90% OR YOUR HEART RATE IS ABOVE 100 PERSISTENTLY.    If you have any of the following symptoms, please present to your local emergency room or call 911: I acknowledge   What is the main issue you would like addressed today? Brown sputum and shortness of breath on exertion   Are you: Having a new or worsening Asthma or COPD problem   Do you have asthma? Yes   Have you had any of the following: Bronchitis;  COPD or emphysema   In the past week, how often have you wheezed? Most days   In the past week, how often have you had shortness of breath? Most days   In the past week, how often have you coughed? Sometimes    In the past week, have you had more sputum or phlegm (mucus)? Yes   Describe the appearance of your sputum or phlegm (mucus). Brown All symptons past few days   How severe is your worst asthma or COPD symptom? Very severe   In the past week, how many times have you used a rescue inhaler, such as albuterol? 5   Do you currently take any preventative medications, such as inhaled steroids? Yes   Do you have trouble taking your medication as prescribed? No   Have you been exposed to any of your known asthma or COPD triggers in the past week? Yes   What triggers have you been exposed to? Dust or pollen   What other symptoms do you have? Runny or stuffy nose    Provide any additional information you feel is important. [facility]   Please attach any relevant images or files    Are you able to take your vital signs? No   How long have you had asthma? More than one year   How would you describe your asthma? Mild   How often do you experience asthma episodes? Less than twice per week   Do you have any of the following symptoms? Fatigue   Do you smoke? No   Have you ever smoked? I smoked in the past, but quit   Are there people you know with similar symptoms? No   Are you having difficulty breathing? Yes   Describe your difficulty in breathing. On exertion for about 10 minutes   Is your asthma worse when you are exposed to pollen, dust, or other things in the environment? Yes   Have you been treated for asthma in the past? Yes   What treatments have worked in the past? What has not worked? Doxycycline   Have you ever been diagnosed with bronchitis, or lung disease? Yes   Enter a few details about your earlier diagnosis and treatment. Breathing treatments and antibiotics and short term steroids   Anything else you would like to add?    I cough. Some of the time   My chest is full of phlegm (mucus). Some of the time   My chest feels very tight. Never   When I walk up a hill or one flight of stairs, I am very breathless. Most of the time   I am very limited doing activities at home. Some of the time   I am confident leaving my home despite my lung condition. All of the time   I sleep soundly. All of the time   I have lots of energy. Some of the time   CAT Score (Impact Level) (range: 0 - 40) 4         Encounter Diagnoses   Name Primary?    Bronchitis Yes    Bacterial infection         Orders Placed This Encounter   Procedures    X-Ray Chest PA And Lateral     Standing Status:   Future     Expected Date:   4/14/2025     Expiration Date:   4/14/2026     May the Radiologist modify the order per protocol to meet the clinical needs of the  patient?:   Yes     Release to patient:   Immediate      Medications Ordered This Encounter   Medications    benzonatate (TESSALON) 200 MG capsule     Sig: Take 1 capsule (200 mg total) by mouth 3 (three) times daily as needed.     Dispense:  30 capsule     Refill:  0    doxycycline (VIBRAMYCIN) 100 MG Cap     Sig: Take 1 capsule (100 mg total) by mouth every 12 (twelve) hours.     Dispense:  20 capsule     Refill:  0    predniSONE (DELTASONE) 20 MG tablet     Sig: Take 1 tablet (20 mg total) by mouth once daily. for 5 days     Dispense:  5 tablet     Refill:  0        Follow up in about 1 week (around 4/20/2025), or if symptoms worsen or fail to improve, for Follow-up on condition.      E-Visit Time Tracking:    Day 1 Time (in minutes): 12    Total Time (in minutes): 12

## 2025-04-14 NOTE — PATIENT INSTRUCTIONS
Follow up in about 1 week (around 4/20/2025), or if symptoms worsen or fail to improve, for Follow-up on condition.     Dear patient,   As a result of recent federal legislation (The Federal Cures Act), you may receive lab or pathology results from your visit in your [facility] account before your physician is able to contact you. Your physician or their representative will relay the results to you with their recommendations at their soonest availability.     If no improvement in symptoms or symptoms worsen, please be advised to call MD, follow-up at clinic and/or go to ER if becomes severe.    Oliver Connor M.D.        We Offer TELEHEALTH & Same Day Appointments!   Book your Telehealth appointment with me through my nurse or   Clinic appointments on [employer]!    [address] [place] [address]    Office: [phone]   FAX: [phone]    Check out my Facebook Page and Follow Me at: [email or web address]    Check out my website at [employer] by clicking on: [email or web address]    To Schedule appointments online, go to [employer]: [email or web address]

## 2025-04-21 ENCOUNTER — E-VISIT (OUTPATIENT)
Dept: URGENT CARE | Facility: CLINIC | Age: 70
End: 2025-04-21
Payer: MEDICARE

## 2025-04-21 DIAGNOSIS — Z86.73 HISTORY OF STROKE: ICD-10-CM

## 2025-04-21 DIAGNOSIS — Z79.899 ENCOUNTER FOR LONG-TERM (CURRENT) USE OF MEDICATIONS: ICD-10-CM

## 2025-04-21 RX ORDER — CLOPIDOGREL 75 MG/1
75 TABLET, FILM COATED ORAL DAILY
Qty: 30 TABLET | Refills: 0 | Status: SHIPPED | OUTPATIENT
Start: 2025-04-21 | End: 2025-05-21

## 2025-04-21 NOTE — PROGRESS NOTES
Dear Sara,     Thank you for reaching out to me for an E-Visit so we can address your concern regarding: General Illness (Entered automatically based on patient selection in [employer].)     I have reviewed your chart and read the answers to the questionnaire that you completed.  Based on the information provided, my diagnosis and recommendations are as follows:    Visit Diagnosis    1. History of stroke    2. Encounter for long-term (current) use of medications      Medications Ordered                [facility] - [address] [address]    Telephone: [phone]   Fax: [phone]   Hours: Not open 24 hours                         E-Prescribed (1 of 1)              PLAVIX 75 mg tablet    Sig: Take 1 tablet (75 mg total) by mouth once daily.       Start: 4/21/25     Quantity: 30 tablet Refills: 0                            Visit Orders  No orders of the defined types were placed in this encounter.       Please let me know if there is something else that you need.  If you send additional messages concerning this illness, please use this message thread to communicate.      Jordyn Rodrigez MD

## 2025-04-22 DIAGNOSIS — Z79.899 ENCOUNTER FOR LONG-TERM (CURRENT) USE OF MEDICATIONS: Primary | ICD-10-CM

## 2025-04-22 DIAGNOSIS — Z86.73 HISTORY OF STROKE: ICD-10-CM

## 2025-04-22 RX ORDER — CLOPIDOGREL BISULFATE 75 MG/1
75 TABLET ORAL DAILY
Qty: 30 TABLET | Refills: 0 | Status: SHIPPED | OUTPATIENT
Start: 2025-04-22 | End: 2026-04-22

## 2025-04-22 NOTE — TELEPHONE ENCOUNTER
Pt of Dr. Connor who is requesting a temporary script be called in for clopidogrel 75mg until PA has been completed for the brand name Plavix. Pt states that she only has about a week left on her script. I am calling [facility] to have the requested PA form faxed over. Please advise. Thank you.

## 2025-04-22 NOTE — TELEPHONE ENCOUNTER
----- Message from Jordyn Rodrigez MD sent at 4/22/2025  3:35 PM CDT -----  Hello, patient had sent an E visit yesterday and sent one again today requesting authorization form to give permission to use brand name rather then generic med for clopidogrel 75mg and needs to be sent to [facility] on [facility] as she has confirmed they have it in stock. Zak Rodrigez

## 2025-04-22 NOTE — TELEPHONE ENCOUNTER
No orders of the defined types were placed in this encounter.      Medications Ordered This Encounter   Medications    clopidogreL (PLAVIX) 75 mg tablet     Sig: Take 1 tablet (75 mg total) by mouth once daily.     Dispense:  30 tablet     Refill:  0

## 2025-04-22 NOTE — TELEPHONE ENCOUNTER
Good afternoon. Dr. Connor is out of the office until next Tuesday. I will send this request to the on call provider for further review. Thank you.

## 2025-04-22 NOTE — TELEPHONE ENCOUNTER
No care due was identified.  [facility] Embedded Care Due Messages. Reference number: [id number].   4/22/2025 3:56:52 PM CDT

## 2025-04-23 NOTE — TELEPHONE ENCOUNTER
"Pt has been called and notified that a PA has been completed on her request for the brand, Plavix, but ws denied per her insurance. Per pt insurance, "information provided does not meet  criteria to establish medical necessity of the requested brand name product over the generic product". Pt verbalized understanding. No further questions at this pt. Pt will  generic, Clopidogrel, from [facility] that was called in by on call provider, Dr. Saenz on, 04/22/2025.  "

## 2025-05-02 ENCOUNTER — RESULTS FOLLOW-UP (OUTPATIENT)
Dept: FAMILY MEDICINE | Facility: CLINIC | Age: 70
End: 2025-05-02

## 2025-05-02 ENCOUNTER — HOSPITAL ENCOUNTER (OUTPATIENT)
Dept: RADIOLOGY | Facility: HOSPITAL | Age: 70
Discharge: HOME | End: 2025-05-02
Attending: FAMILY MEDICINE
Payer: MEDICARE

## 2025-05-02 DIAGNOSIS — J40 BRONCHITIS: ICD-10-CM

## 2025-05-02 PROCEDURE — 71046 X-RAY EXAM CHEST 2 VIEWS: CPT | Mod: 26,,, | Performed by: STUDENT IN AN ORGANIZED HEALTH CARE EDUCATION/TRAINING PROGRAM

## 2025-05-02 PROCEDURE — 71046 X-RAY EXAM CHEST 2 VIEWS: CPT | Mod: TC,PO

## 2025-05-02 NOTE — PROGRESS NOTES
Sara Stark, Your chest x-ray was within normal limits.  No lesions were found and no fluid was noted.  The heart appears normal.  Incidental findings of aortic calcifications.  Surgical clips noted in the right upper quadrant.  Hardware noted in the left humeral head.  Please do not hesitate to contact us if you have any further questions.  We will see you at your next visit.    Please call patient with these normal results if they are not enrolled with my chart.

## 2025-05-05 ENCOUNTER — OFFICE VISIT (OUTPATIENT)
Dept: PULMONOLOGY | Facility: CLINIC | Age: 70
End: 2025-05-05
Payer: MEDICARE

## 2025-05-05 ENCOUNTER — TELEPHONE (OUTPATIENT)
Dept: FAMILY MEDICINE | Facility: CLINIC | Age: 70
End: 2025-05-05
Payer: MEDICARE

## 2025-05-05 VITALS
SYSTOLIC BLOOD PRESSURE: 125 MMHG | HEART RATE: 77 BPM | HEIGHT: 67 IN | OXYGEN SATURATION: 94 % | BODY MASS INDEX: 26.47 KG/M2 | WEIGHT: 168.63 LBS | RESPIRATION RATE: 18 BRPM | DIASTOLIC BLOOD PRESSURE: 70 MMHG

## 2025-05-05 DIAGNOSIS — F17.210 SMOKING GREATER THAN 25 PACK YEARS: Primary | ICD-10-CM

## 2025-05-05 DIAGNOSIS — J10.1 INFLUENZA A: ICD-10-CM

## 2025-05-05 DIAGNOSIS — J44.9 COPD SUGGESTED BY INITIAL EVALUATION: ICD-10-CM

## 2025-05-05 DIAGNOSIS — Z87.891 FORMER SMOKER: ICD-10-CM

## 2025-05-05 DIAGNOSIS — J44.89 COPD WITH ASTHMA: ICD-10-CM

## 2025-05-05 DIAGNOSIS — R06.02 SOBOE (SHORTNESS OF BREATH ON EXERTION): ICD-10-CM

## 2025-05-05 PROCEDURE — 99999 PR PBB SHADOW E&M-EST. PATIENT-LVL V: CPT | Mod: PBBFAC,,, | Performed by: INTERNAL MEDICINE

## 2025-05-05 PROCEDURE — 99215 OFFICE O/P EST HI 40 MIN: CPT | Mod: PBBFAC | Performed by: INTERNAL MEDICINE

## 2025-05-05 PROCEDURE — 99204 OFFICE O/P NEW MOD 45 MIN: CPT | Mod: S$PBB,,, | Performed by: INTERNAL MEDICINE

## 2025-05-05 RX ORDER — ALBUTEROL SULFATE 90 UG/1
2 INHALANT RESPIRATORY (INHALATION) 4 TIMES DAILY
Qty: 3 EACH | Refills: 3 | Status: SHIPPED | OUTPATIENT
Start: 2025-05-05

## 2025-05-05 RX ORDER — FLUTICASONE FUROATE, UMECLIDINIUM BROMIDE AND VILANTEROL TRIFENATATE 100; 62.5; 25 UG/1; UG/1; UG/1
1 POWDER RESPIRATORY (INHALATION) DAILY
Qty: 60 EACH | Refills: 12 | Status: SHIPPED | OUTPATIENT
Start: 2025-05-05

## 2025-05-05 NOTE — PROGRESS NOTES
Initial Outpatient Pulmonary Evaluation       SUBJECTIVE:     Chief Complaint   Patient presents with    COPD       History of Present Illness:    Patient is a 70 y.o. female     History of Present Illness    CHIEF COMPLAINT:  Patient presents today for shortness of breath when walking across the room    HISTORY OF PRESENT ILLNESS:  She had pneumonia two months ago caused by influenza A virus, which was managed at home after emergency evaluation. She started using albuterol following this flu illness.    RESPIRATORY HISTORY:  She has both asthma and COPD diagnoses, with asthma being the more severe condition. She began using inhalers at age 51 and currently takes Trelegy Ellipta daily. She was previously under the care of Dr. Rosa, a pulmonologist in [place], prior to relocating to the area 1 year ago.    SOCIAL HISTORY:  She has a 25 pack-year smoking history (one pack per day for 25 years) and quit in 2018.    SURGICAL HISTORY:  Her surgical history includes cholecystectomy and left rotator cuff repair.           Review of Systems   Respiratory:  Positive for cough, shortness of breath, dyspnea on extertion and use of rescue inhaler.    Psychiatric/Behavioral:  Positive for sleep disturbance.        Review of patient's allergies indicates:   Allergen Reactions    Levofloxacin in d5w Blisters and Swelling    Levofloxacin Other (See Comments)    Cephalexin Hives, Itching, Nausea And Vomiting and Rash    Lithium Nausea And Vomiting       Current Medications[1]    Past Medical History:   Diagnosis Date    Bipolar disorder, unspecified     COPD (chronic obstructive pulmonary disease)      Past Surgical History:   Procedure Laterality Date    CHOLECYSTECTOMY  Unknown    COLONOSCOPY N/A 9/24/2024    Procedure: COLONOSCOPY okay to hold plavix x 5 days per Dr. Olvier Connor 8/22/24, in chart.;  Surgeon: Sanjiv Naylor MD;  Location: [facility];  Service: Endoscopy;   "Laterality: N/A;    HYSTERECTOMY  1996    SPINE SURGERY  1984    Lumbar laminectomy L4-L5    TONSILLECTOMY  Child    TUBAL LIGATION  1984     Family History   Problem Relation Name Age of Onset    Alcohol abuse Mother Sara Rae     Cancer Mother Sara Rae     COPD Mother Sara Rae     Depression Mother Sara Rae     Heart disease Mother Sara Rae     Hyperlipidemia Mother Sara Rae     Hypertension Mother Sara Rae     Vision loss Mother Sara Rae     Heart disease Father Rob Jain     Hyperlipidemia Father Rob Jain      Social History[2]       OBJECTIVE:     Vital Signs (Most Recent)  Vital Signs  Pulse: 77  Resp: 18  SpO2: (!) 94 %  BP: 125/70  Patient Position: Sitting  Pain Score: 0-No pain  Height and Weight  Height: 5' 7" (170.2 cm)  Weight: 76.5 kg (168 lb 10.4 oz)  BSA (Calculated - sq m): 1.9 sq meters  BMI (Calculated): 26.4  Weight in (lb) to have BMI = 25: 159.3]  Wt Readings from Last 2 Encounters:   05/05/25 76.5 kg (168 lb 10.4 oz)   10/09/24 76 kg (167 lb 8.8 oz)         Physical Exam:  Physical Exam   Constitutional: She is oriented to person, place, and time. She appears well-developed. No distress.   HENT:   Head: Normocephalic.   Cardiovascular: Normal rate and regular rhythm.   Pulmonary/Chest: Normal expansion and effort normal. No stridor. No respiratory distress. She has no wheezes. She has no rhonchi.   Neurological: She is alert and oriented to person, place, and time.   Psychiatric: She has a normal mood and affect. Her behavior is normal. Judgment and thought content normal.   Nursing note and vitals reviewed.      Laboratory  Lab Results   Component Value Date    WBC 8.26 08/16/2024    RBC 4.56 08/16/2024    HGB 12.3 08/16/2024    HCT 41.8 08/16/2024    MCV 92 08/16/2024    MCH 27.0 08/16/2024    MCHC 29.4 (L) 08/16/2024    RDW 14.6 (H) 08/16/2024     08/16/2024    MPV 9.4 08/16/2024 " "      BMP  Lab Results   Component Value Date     02/20/2025    K 4.3 02/20/2025     08/16/2024    CO2 24 02/20/2025    BUN 7 (L) 02/20/2025    CREATININE 0.76 02/20/2025    CALCIUM 9.4 02/20/2025    ANIONGAP 10 02/20/2025    AST 20 02/20/2025    ALT 23 02/20/2025    PROT 7 02/20/2025       No results found for: "BNP"    Lab Results   Component Value Date    TSH 0.473 08/16/2024       No results found for: "SEDRATE"    No results found for: "CRP"    No results found for: "IGE"    No results found for: "ASPERGILLUS"  No results found for: "AFUMIGATUSCL"     No results found for: "ACE"    Diagnostic Results:  I have personally reviewed today the following studies :    AS ABOVE    ASSESSMENT/PLAN:   Assessment & Plan    J44.9 COPD suggested by initial evaluation  J44.1 COPD exacerbation  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  - Evaluated smoking history: 25 pack-years, quit 7 years ago.  - COPD diagnosis based on long-term use of Trelegy Ellipta.  - Reviewed recent chest XR, noted as stable.  - Lungs stable and recovering from recent flu.  - COPD likely rather than asthma based on age of inhaler initiation (50s) and smoking history.  - Qualifies for lung cancer screening based on smoking history.    COPD SUGGESTED BY INITIAL EVALUATION:  - Ordered pulmonary function test.  - Ordered walking test.  - Follow up after completion of ordered tests to discuss results and potential treatment changes.    COPD WITH ASTHMA:  - Instructed on proper technique for using Trelegy Ellipta: empty lungs fully, seal lips on inhaler, take a deep breath and inhale, then rinse mouth with water to prevent thrush.  - Continue Trelegy Ellipta: use daily as previously prescribed.  - Continue albuterol inhaler as needed.    FORMER SMOKER:  - Educated on lung cancer screening " eligibility criteria: quitting within past 15 years.    SMOKING GREATER THAN 25 PACK YEARS:  - Educated on lung cancer screening eligibility criteria: 20+ pack-year smoking history.  - Explained low-dose CT for lung cancer screening delivers minimal radiation.  - Ordered low-dose CT Chest for lung cancer screening.         Smoking greater than 25 pack years  -     CT Chest Lung Screening Low Dose; Future; Expected date: 2026    COPD suggested by initial evaluation  -     Complete PFT with bronchodilator; Future  -     Stress test, pulmonary; Future  -     Fraction of  Nitric Oxide; Future; Expected date: 2025    COPD with asthma  -     fluticasone-umeclidin-vilanter (TRELEGY ELLIPTA) 100-62.5-25 mcg DsDv; Inhale 1 puff into the lungs once daily.  Dispense: 60 each; Refill: 12  -     albuterol sulfate (PROAIR DIGIHALER) 90 mcg/actuation aebs; Inhale 2 puffs into the lungs 4 (four) times daily.  Dispense: 3 each; Refill: 3    SOBOE (shortness of breath on exertion)  -     Ambulatory referral/consult to Pulmonology    Influenza A    Former smoker    Continue smoking cessation.  Follow up in about 3 months (around 2025).    This note was prepared using voice recognition system and is likely to have sound alike errors that may have been overlooked even after proof reading.  Please call me with any questions    Discussed diagnosis, its evaluation, treatment and usual course. All questions answered.    Thank you for the courtesy of participating in the care of this patient    Sujit Henry MD             [1]   Current Outpatient Medications   Medication Sig Dispense Refill    albuterol (PROVENTIL) 2.5 mg /3 mL (0.083 %) nebulizer solution Take 2.5 mg by nebulization 4 (four) times daily.      clonazePAM (KLONOPIN) 0.25 MG TbDL 0.25 mg once daily.      clonazePAM (KLONOPIN) 0.5 MG tablet Take 0.5 mg by mouth 2 (two) times daily as needed for Anxiety.      clopidogreL (PLAVIX) 75 mg tablet Take 1  tablet (75 mg total) by mouth once daily. 30 tablet 0    cyanocobalamin (VITAMIN B-12) 1000 MCG tablet Take 1 tablet every day by oral route as directed.      DULoxetine (CYMBALTA) 20 MG capsule Take 20 mg by mouth.      ezetimibe (ZETIA) 10 mg tablet Take 1 tablet (10 mg total) by mouth every evening. 90 tablet 4    folic acid (FOLVITE) 1 MG tablet       gabapentin (NEURONTIN) 100 MG capsule Take 2 capsules (200 mg total) by mouth every evening. 180 capsule 4    GEODON 20 mg Cap       krill-om-3-dha-epa-phospho-ast (KRILL OIL) 333-415-84-75 mg Cap       lamoTRIgine (LAMICTAL) 200 MG tablet Take 1 tablet every day by oral route as directed.      levOCARNitine (L-CARNITINE) 500 mg Cap       pilocarpine (SALAGEN) 5 MG Tab Take 5 mg by mouth 2 (two) times daily.      PLAVIX 75 mg tablet Take 1 tablet (75 mg total) by mouth once daily. 30 tablet 0    propranoloL (INDERAL) 10 MG tablet       QUEtiapine (SEROQUEL XR) 200 MG Tb24 200 mg.      vitamin B complex (SUPER B COMPLEX-B-12 ORAL)       zolpidem (AMBIEN) 10 mg Tab       albuterol sulfate (PROAIR DIGIHALER) 90 mcg/actuation aebs Inhale 2 puffs into the lungs 4 (four) times daily. 3 each 3    fluticasone-umeclidin-vilanter (TRELEGY ELLIPTA) 100-62.5-25 mcg DsDv Inhale 1 puff into the lungs once daily. 60 each 12    montelukast (SINGULAIR) 10 mg tablet Take 1 tablet (10 mg total) by mouth every evening. 90 tablet 4    omeprazole (PRILOSEC) 20 MG capsule Take 1 capsule (20 mg total) by mouth once daily. 90 capsule 3     No current facility-administered medications for this visit.   [2]   Social History  Tobacco Use    Smoking status: Never    Tobacco comments:     Prior smoker 1 ppd quit 8 yrs ago   Substance Use Topics    Alcohol use: Not Currently     Alcohol/week: 1.0 standard drink of alcohol     Types: 1 Glasses of wine per week     Comment: Once a month at most    Drug use: Never

## 2025-05-05 NOTE — TELEPHONE ENCOUNTER
----- Message from Mignon sent at 5/5/2025 12:02 PM CDT -----  Contact: Sara  Type:  Patient Returning CallWho Called:Esau Ruiz Message for Patient:Catalina the patient know what this is regarding?:test results/ missed callWould the patient rather a call back or a response via [facility]? Call [facility] Call Back Number:[phone] Information: Sara missed a call and [facility]

## 2025-05-07 DIAGNOSIS — Z86.73 HISTORY OF STROKE: ICD-10-CM

## 2025-05-07 DIAGNOSIS — Z79.899 ENCOUNTER FOR LONG-TERM (CURRENT) USE OF MEDICATIONS: ICD-10-CM

## 2025-05-07 RX ORDER — CLOPIDOGREL BISULFATE 75 MG/1
75 TABLET ORAL DAILY
Qty: 90 TABLET | Refills: 1 | Status: SHIPPED | OUTPATIENT
Start: 2025-05-07 | End: 2026-05-07

## 2025-05-07 NOTE — TELEPHONE ENCOUNTER
No care due was identified.  [facility] Embedded Care Due Messages. Reference number: [id number].   5/07/2025 3:19:03 PM CDT

## 2025-05-07 NOTE — TELEPHONE ENCOUNTER
Refill Routing Note   Medication(s) are not appropriate for processing by [facility] for the following reason(s):        New or recently adjusted medication  No active prescription written by provider    [facility] action(s):  Defer               Appointments  past 12m or future 3m with PCP    Date Provider   Last Visit   4/13/2025 Oliver Connor MD   Next Visit   Visit date not found Oliver Connor MD   ED visits in past 90 days: 0        Note composed:3:28 PM 05/07/2025

## 2025-05-14 ENCOUNTER — CLINICAL SUPPORT (OUTPATIENT)
Dept: PULMONOLOGY | Facility: CLINIC | Age: 70
End: 2025-05-14
Payer: MEDICARE

## 2025-05-14 ENCOUNTER — HOSPITAL ENCOUNTER (OUTPATIENT)
Dept: RADIOLOGY | Facility: HOSPITAL | Age: 70
Discharge: HOME | End: 2025-05-14
Attending: INTERNAL MEDICINE
Payer: MEDICARE

## 2025-05-14 VITALS — BODY MASS INDEX: 26.47 KG/M2 | WEIGHT: 168.63 LBS | HEIGHT: 67 IN

## 2025-05-14 DIAGNOSIS — J44.9 COPD SUGGESTED BY INITIAL EVALUATION: ICD-10-CM

## 2025-05-14 DIAGNOSIS — F17.210 SMOKING GREATER THAN 25 PACK YEARS: ICD-10-CM

## 2025-05-14 PROCEDURE — 71271 CT THORAX LUNG CANCER SCR C-: CPT | Mod: 26,,, | Performed by: RADIOLOGY

## 2025-05-14 PROCEDURE — 99999PBSHW PR PBB SHADOW TECHNICAL ONLY FILED TO HB: Mod: PBBFAC,,,

## 2025-05-14 PROCEDURE — 94618 PULMONARY STRESS TESTING: CPT | Mod: PBBFAC

## 2025-05-14 PROCEDURE — 71271 CT THORAX LUNG CANCER SCR C-: CPT | Mod: TC

## 2025-05-14 PROCEDURE — 99999 PR PBB SHADOW E&M-EST. PATIENT-LVL I: CPT | Mod: PBBFAC,,,

## 2025-05-14 PROCEDURE — 99211 OFF/OP EST MAY X REQ PHY/QHP: CPT | Mod: PBBFAC,25

## 2025-05-14 PROCEDURE — 94060 EVALUATION OF WHEEZING: CPT | Mod: PBBFAC

## 2025-05-14 PROCEDURE — 94726 PLETHYSMOGRAPHY LUNG VOLUMES: CPT | Mod: PBBFAC

## 2025-05-14 PROCEDURE — 94729 DIFFUSING CAPACITY: CPT | Mod: PBBFAC

## 2025-05-14 PROCEDURE — 95012 NITRIC OXIDE EXP GAS DETER: CPT | Mod: PBBFAC

## 2025-05-14 NOTE — PROCEDURES
"[facility] - Pulmonary Function  Six Minute Walk     SUMMARY     Ordering Provider: Elaine PINEDA   Interpreting Provider: Elaine PINEDA  Performing nurse/tech/RT: LS RRT  Diagnosis: COPD  Height: 5' 7" (170.2 cm)  Weight: 76.5 kg (168 lb 10.4 oz)  BMI (Calculated): 26.4                Phase Oxygen Assessment Supplemental O2 Heart   Rate Blood Pressure Bhavya Dyspnea Scale Rating   Resting 94 % Room Air 77 bpm 112/55 3   Exercise        Minute        1 91 % Room Air 68 bpm     2 93 % Room Air 67 bpm     3 93 % Room Air 98 bpm     4 93 % Room Air 97 bpm     5 92 % Room Air 90 bpm     6  94 % Room Air 96 bpm 124/61 7-8   Recovery        Minute        1 96 % Room Air 85 bpm     2 97 % Room Air 67 bpm     3 97 % Room Air 63 bpm     4 98 % Room Air 97 bpm 132/63 2     Six Minute Walk Summary  6MWT Status: completed without stopping  Patient Reported: Lightheadedness     Interpretation:  Did the patient stop or pause?: No                                         Total Time Walked (Calculated): 360 seconds  Final Partial Lap Distance (feet): 100 feet  Total Distance Meters (Calculated): 274.32 meters  Predicted Distance Meters (Calculated): 452.12 meters  Percentage of Predicted (Calculated): 60.67  Peak VO2 (Calculated): 12.21  Mets: 3.49  Has The Patient Had a Previous Six Minute Walk Test?: No       Previous 6MWT Results  Has The Patient Had a Previous Six Minute Walk Test?: No    " All orders entered per VO of Dr. Gilberto Hernadez:        Echo- Shortness of breath.

## 2025-05-14 NOTE — PROCEDURES
Clinical Guide to Interpretation or FeNO Levels :    FeNO  (ppb) LOW INTERMEDIATE HIGH   ADULT VALUES < 25 25-50          > 50   Th2-driven Inflammation Unlikely Likely Significant     Patients FeNO level at this visit : _20___ (ppb)    Interpretation of FeNO measurement in adults:  [x] FENO is less than 25 ppb implies non eosinophilic airway inflammation or the absence of airway inflammation.    Comment: Low FENO (<25 ppb) in adult asthmatics with persistent symptoms suggests other etiologies for these symptoms and a lower likelihood of benefit from adding or increasing inhaled glucocorticoids.    [] FENO between 25 and 50 ppb in adults should be interpreted cautiously with reference to the clinical situation (eg, symptomatic, on or off therapy, current smoking).    [] FENO greater than 50 ppb in adults  suggests eosinophilic airway inflammation   Comment: High FENO (>50 ppb) in adult asthmatics even with atypical symptoms suggests glucocorticoid responsiveness. High FENO (>50 ppb) can help identify poor adherence or uncontrolled inflammation in asthma patients with otherwise seemingly controlled asthma.    Discussion:  A FENO less than 25 ppb in adults and less than 20 ppb in children younger than 12 years of age implies noneosinophilic airway inflammation or the absence of airway inflammation.  A FENO greater than 50 ppb in adults or greater than 35 ppb in children suggests eosinophilic airway inflammation.   Values of FENO between 25 and 50 ppb in adults (20 to 35 ppb in children) should be interpreted cautiously with reference to the clinical situation (eg, symptomatic, on or off therapy, current smoking).  A rising FENO with a greater than 20 percent change and more than 25 ppb (20 ppb in children) from a previously stable level suggests increasing eosinophilic airway inflammation, but there are wide inter-individual differences.  A decrease in FENO greater than 20 percent for values over 50 ppb or more than  10 ppb for values less than 50 ppb may be clinically important.  ?FENO in other respiratory diseases - Several other diseases are associated with altered levels of exhaled NO: low levels of FENO have been noted in cystic fibrosis, current smoking, pulmonary hypertension, hypothermia, primary ciliary dyskinesia, and bronchopulmonary dysplasia. Elevated FENO has been noted in atopy, nonasthmatic eosinophilic bronchitis, COPD exacerbations, noncystic fibrosis bronchiectasis, and viral upper respiratory infections.    REFERENCE:  ATS Board of Directors, December 2004, and by the ERS Executive Committee, June 2004. ATS/ERS Recommendations for Standardized Procedures for the Online and Offline Measurement of Exhaled Lower Respiratory Nitric Oxide and Nasal Nitric Oxide. Guideline 2005

## 2025-05-16 LAB
BRPFT: ABNORMAL
DLCO ADJ PRE: 10.22 ML/(MIN*MMHG) (ref 17.51–28.98)
DLCO SINGLE BREATH LLN: 17.51
DLCO SINGLE BREATH PRE REF: 44 %
DLCO SINGLE BREATH REF: 23.25
DLCOC SBVA LLN: 2.97
DLCOC SBVA PRE REF: 68.2 %
DLCOC SBVA REF: 4.28
DLCOC SINGLE BREATH LLN: 17.51
DLCOC SINGLE BREATH PRE REF: 44 %
DLCOC SINGLE BREATH REF: 23.25
DLCOVA LLN: 2.97
DLCOVA PRE REF: 68.2 %
DLCOVA PRE: 2.92 ML/(MIN*MMHG*L) (ref 2.97–5.59)
DLCOVA REF: 4.28
DLVAADJ PRE: 2.92 ML/(MIN*MMHG*L) (ref 2.97–5.59)
ERV LLN: -16449.3
ERV PRE REF: 35.9 %
ERV REF: 0.7
FEF 25 75 CHG: -18.6 %
FEF 25 75 LLN: 1.3
FEF 25 75 POST REF: 14.7 %
FEF 25 75 PRE REF: 18.1 %
FEF 25 75 REF: 2.7
FET100 CHG: 29.9 %
FEV1 CHG: 5.9 %
FEV1 FVC CHG: -2.7 %
FEV1 FVC LLN: 65
FEV1 FVC POST REF: 70 %
FEV1 FVC PRE REF: 71.9 %
FEV1 FVC REF: 78
FEV1 LLN: 1.79
FEV1 POST REF: 44.1 %
FEV1 PRE REF: 41.6 %
FEV1 REF: 2.45
FRCPLETH LLN: 2.05
FRCPLETH PREREF: 188.8 %
FRCPLETH REF: 2.88
FVC CHG: 8.8 %
FVC LLN: 2.33
FVC POST REF: 62.4 %
FVC PRE REF: 57.3 %
FVC REF: 3.18
IVC PRE: 1.57 L (ref 2.33–4.03)
IVC SINGLE BREATH LLN: 2.33
IVC SINGLE BREATH PRE REF: 49.5 %
IVC SINGLE BREATH REF: 3.18
MVV LLN: 81
MVV PRE REF: 28.2 %
MVV REF: 96
PEF CHG: 0.3 %
PEF LLN: 4.31
PEF POST REF: 52.5 %
PEF PRE REF: 52.3 %
PEF REF: 6.18
POST FEF 25 75: 0.4 L/S (ref 1.3–4.1)
POST FET 100: 7.68 SEC
POST FEV1 FVC: 54.49 % (ref 64.73–90.95)
POST FEV1: 1.08 L (ref 1.79–3.11)
POST FVC: 1.98 L (ref 2.33–4.03)
POST PEF: 3.24 L/S (ref 4.31–8.05)
PRE DLCO: 10.22 ML/(MIN*MMHG) (ref 17.51–28.98)
PRE ERV: 0.25 L (ref -16449.3–16450.7)
PRE FEF 25 75: 0.49 L/S (ref 1.3–4.1)
PRE FET 100: 5.91 SEC
PRE FEV1 FVC: 56.01 % (ref 64.73–90.95)
PRE FEV1: 1.02 L (ref 1.79–3.11)
PRE FRC PL: 5.43 L
PRE FVC: 1.82 L (ref 2.33–4.03)
PRE MVV: 27 L/MIN (ref 81.26–109.94)
PRE PEF: 3.23 L/S (ref 4.31–8.05)
PRE RV: 5.03 L (ref 1.61–2.76)
PRE TLC: 7 L (ref 4.44–6.42)
RAW LLN: 3.06
RAW PRE REF: 253 %
RAW PRE: 7.74 CMH2O*S/L (ref 3.06–3.06)
RAW REF: 3.06
RV LLN: 1.61
RV PRE REF: 230.7 %
RV REF: 2.18
RVTLC LLN: 33
RVTLC PRE REF: 169.4 %
RVTLC PRE: 71.87 % (ref 32.83–52.01)
RVTLC REF: 42
TLC LLN: 4.44
TLC PRE REF: 129 %
TLC REF: 5.43
VA PRE: 3.5 L (ref 5.28–5.28)
VA SINGLE BREATH LLN: 5.28
VA SINGLE BREATH PRE REF: 66.3 %
VA SINGLE BREATH REF: 5.28
VC LLN: 2.33
VC PRE REF: 62 %
VC PRE: 1.97 L (ref 2.33–4.03)
VC REF: 3.18
VTGRAWPRE: 6.12 L

## 2025-05-18 ENCOUNTER — RESULTS FOLLOW-UP (OUTPATIENT)
Dept: PULMONOLOGY | Facility: HOSPITAL | Age: 70
End: 2025-05-18

## 2025-05-18 DIAGNOSIS — F17.210 SMOKING GREATER THAN 25 PACK YEARS: Primary | ICD-10-CM

## 2025-07-19 ENCOUNTER — E-VISIT (OUTPATIENT)
Dept: FAMILY MEDICINE | Facility: CLINIC | Age: 70
End: 2025-07-19
Payer: MEDICARE

## 2025-07-19 DIAGNOSIS — M35.00 SJOGREN'S SYNDROME, WITH UNSPECIFIED ORGAN INVOLVEMENT: Primary | ICD-10-CM

## 2025-07-20 RX ORDER — PILOCARPINE HYDROCHLORIDE 5 MG/1
5 TABLET, FILM COATED ORAL 2 TIMES DAILY
Qty: 180 TABLET | Refills: 1 | Status: SHIPPED | OUTPATIENT
Start: 2025-07-20 | End: 2026-01-16

## 2025-07-20 NOTE — PROGRESS NOTES
Patient ID: Sara Stark is a 70 y.o. female.        E-Visit Time Tracking:   Day 1 Time (in minutes): 7  Total Time (in minutes): 7      Chief Complaint: General Illness (Entered automatically based on patient selection in [employer].)      The patient initiated a request through [employer] on 7/19/2025 for evaluation and management with a chief complaint of General Illness (Entered automatically based on patient selection in [employer].)     I evaluated the questionnaire submission on 07/20/2025.    [facility] E[facility]-Medication    7/19/2025  4:08 PM CDT - Filed by Patient   What do you need help with? Medication Request   Do you agree to participate in an E-Visit? Yes   If you have any of the following symptoms, please present to your local emergency room or call 911:  I acknowledge   Medication requests for narcotics will not be addressed via an E-Visit.  Please schedule an appointment. I acknowledge   Do you want to address a new or existing medication? (Start a new medication, Address a current medication) Address a current medication   What is the main issue you would like addressed today? I am out of my Salagen 5 mg BID for my Sjogrens. I need a née prescription with refills for 90-day supply please   Would you like to change or continue your medication? (Change medication, Continue medication) Continue medication   What is the name of the medication you would like to continue? Salagen 5 mg BID   Are you taking your medication as prescribed? Yes   Which option below best describes the reason for your request? (Renew refills, Prior authorization is required) Renew refills    What medical condition is the  medication intended to treat? Sjogrens   Has the medication helped your condition? (Yes, No, Not sure) Yes   Have you experienced any side effects from the medication? No   Provide any information you feel is important to your history not asked above    Please attach any relevant images or files     Are you able to take your vitals? Yes   Systolic Blood Pressure 114   Diastolic Blood Pressure 68   Weight: 167   Height: 67   Pluse: 90   Temp    Resp: 22   SpO2          Encounter Diagnosis   Name Primary?    Sjogren's syndrome, with unspecified organ involvement Yes        No orders of the defined types were placed in this encounter.     Medications Ordered This Encounter   Medications    pilocarpine (SALAGEN) 5 MG Tab     Sig: Take 1 tablet (5 mg total) by mouth 2 (two) times daily.     Dispense:  180 tablet     Refill:  1        Follow up in about 6 months (around 1/19/2026), or if symptoms worsen or fail to improve, for Med refills, LAB RESULTS.

## 2025-07-20 NOTE — PATIENT INSTRUCTIONS
Follow up in about 6 months (around 1/19/2026), or if symptoms worsen or fail to improve, for Med refills, LAB RESULTS.     Dear patient,   As a result of recent federal legislation (The Federal Cures Act), you may receive lab or pathology results from your visit in your [facility] account before your physician is able to contact you. Your physician or their representative will relay the results to you with their recommendations at their soonest availability.     If no improvement in symptoms or symptoms worsen, please be advised to call MD, follow-up at clinic and/or go to ER if becomes severe.    Oliver Connor M.D.        We Offer TELEHEALTH & Same Day Appointments!   Book your Telehealth appointment with me through my nurse or   Clinic appointments on [id number]!    [address] [place] [address]    Office: [phone]   FAX: [phone]    Check out my Facebook Page and Follow Me at: [email or web address]    Check out my website at [employer] by clicking on: [email or web address]    To Schedule appointments online, go to [email or web address]: [email or web address]

## 2025-08-18 ENCOUNTER — OFFICE VISIT (OUTPATIENT)
Dept: PULMONOLOGY | Facility: CLINIC | Age: 70
End: 2025-08-18
Payer: MEDICARE

## 2025-08-18 VITALS
DIASTOLIC BLOOD PRESSURE: 79 MMHG | OXYGEN SATURATION: 93 % | RESPIRATION RATE: 22 BRPM | HEART RATE: 81 BPM | SYSTOLIC BLOOD PRESSURE: 120 MMHG | WEIGHT: 166.25 LBS | BODY MASS INDEX: 26.09 KG/M2 | HEIGHT: 67 IN

## 2025-08-18 DIAGNOSIS — F17.210 SMOKING GREATER THAN 25 PACK YEARS: ICD-10-CM

## 2025-08-18 DIAGNOSIS — R06.02 SOBOE (SHORTNESS OF BREATH ON EXERTION): ICD-10-CM

## 2025-08-18 DIAGNOSIS — Z87.891 FORMER SMOKER: ICD-10-CM

## 2025-08-18 DIAGNOSIS — J44.89 COPD WITH ASTHMA: Primary | ICD-10-CM

## 2025-08-18 PROCEDURE — 99214 OFFICE O/P EST MOD 30 MIN: CPT | Mod: PBBFAC | Performed by: INTERNAL MEDICINE

## 2025-08-18 PROCEDURE — 99999 PR PBB SHADOW E&M-EST. PATIENT-LVL IV: CPT | Mod: PBBFAC,,, | Performed by: INTERNAL MEDICINE

## 2025-08-18 RX ORDER — FLUTICASONE FUROATE, UMECLIDINIUM BROMIDE AND VILANTEROL TRIFENATATE 200; 62.5; 25 UG/1; UG/1; UG/1
1 POWDER RESPIRATORY (INHALATION) DAILY
Qty: 60 EACH | Refills: 5 | Status: SHIPPED | OUTPATIENT
Start: 2025-08-18

## 2025-08-19 ENCOUNTER — PATIENT MESSAGE (OUTPATIENT)
Dept: PULMONOLOGY | Facility: CLINIC | Age: 70
End: 2025-08-19
Payer: MEDICARE